# Patient Record
Sex: FEMALE | Race: WHITE | Employment: PART TIME | URBAN - METROPOLITAN AREA
[De-identification: names, ages, dates, MRNs, and addresses within clinical notes are randomized per-mention and may not be internally consistent; named-entity substitution may affect disease eponyms.]

---

## 2017-04-07 ENCOUNTER — TELEPHONE (OUTPATIENT)
Dept: ENDOCRINOLOGY | Age: 27
End: 2017-04-07

## 2019-02-11 ENCOUNTER — OFFICE VISIT (OUTPATIENT)
Dept: FAMILY MEDICINE CLINIC | Age: 29
End: 2019-02-11
Payer: COMMERCIAL

## 2019-02-11 VITALS
BODY MASS INDEX: 35.44 KG/M2 | HEART RATE: 68 BPM | HEIGHT: 64 IN | WEIGHT: 207.6 LBS | DIASTOLIC BLOOD PRESSURE: 72 MMHG | SYSTOLIC BLOOD PRESSURE: 118 MMHG | OXYGEN SATURATION: 99 %

## 2019-02-11 DIAGNOSIS — F31.32 BIPOLAR 1 DISORDER, DEPRESSED, MODERATE (HCC): ICD-10-CM

## 2019-02-11 DIAGNOSIS — E10.65 UNCONTROLLED TYPE 1 DIABETES MELLITUS WITH HYPERGLYCEMIA (HCC): Primary | ICD-10-CM

## 2019-02-11 DIAGNOSIS — E06.3 HASHIMOTO'S DISEASE: ICD-10-CM

## 2019-02-11 PROCEDURE — 2022F DILAT RTA XM EVC RTNOPTHY: CPT | Performed by: FAMILY MEDICINE

## 2019-02-11 PROCEDURE — 99203 OFFICE O/P NEW LOW 30 MIN: CPT | Performed by: FAMILY MEDICINE

## 2019-02-11 PROCEDURE — G8484 FLU IMMUNIZE NO ADMIN: HCPCS | Performed by: FAMILY MEDICINE

## 2019-02-11 PROCEDURE — 4004F PT TOBACCO SCREEN RCVD TLK: CPT | Performed by: FAMILY MEDICINE

## 2019-02-11 PROCEDURE — G8417 CALC BMI ABV UP PARAM F/U: HCPCS | Performed by: FAMILY MEDICINE

## 2019-02-11 PROCEDURE — G8427 DOCREV CUR MEDS BY ELIG CLIN: HCPCS | Performed by: FAMILY MEDICINE

## 2019-02-11 PROCEDURE — 3046F HEMOGLOBIN A1C LEVEL >9.0%: CPT | Performed by: FAMILY MEDICINE

## 2019-02-11 RX ORDER — LAMOTRIGINE 25 MG/1
50 TABLET ORAL NIGHTLY
COMMUNITY
Start: 2018-12-28 | End: 2019-10-01 | Stop reason: DRUGHIGH

## 2019-02-11 RX ORDER — ROSUVASTATIN CALCIUM 20 MG/1
50 TABLET, COATED ORAL NIGHTLY
COMMUNITY
Start: 2018-12-28 | End: 2019-07-22 | Stop reason: SDUPTHER

## 2019-02-11 RX ORDER — LEVOTHYROXINE SODIUM 0.03 MG/1
25 TABLET ORAL 2 TIMES DAILY
COMMUNITY
Start: 2018-12-28 | End: 2019-04-17 | Stop reason: SDUPTHER

## 2019-02-11 ASSESSMENT — ENCOUNTER SYMPTOMS: RESPIRATORY NEGATIVE: 1

## 2019-04-17 ENCOUNTER — OFFICE VISIT (OUTPATIENT)
Dept: FAMILY MEDICINE CLINIC | Age: 29
End: 2019-04-17
Payer: COMMERCIAL

## 2019-04-17 VITALS
OXYGEN SATURATION: 99 % | HEART RATE: 76 BPM | BODY MASS INDEX: 34.97 KG/M2 | SYSTOLIC BLOOD PRESSURE: 116 MMHG | HEIGHT: 64 IN | DIASTOLIC BLOOD PRESSURE: 82 MMHG | WEIGHT: 204.8 LBS

## 2019-04-17 DIAGNOSIS — G43.009 MIGRAINE WITHOUT AURA AND WITHOUT STATUS MIGRAINOSUS, NOT INTRACTABLE: ICD-10-CM

## 2019-04-17 DIAGNOSIS — E10.65 UNCONTROLLED TYPE 1 DIABETES MELLITUS WITH HYPERGLYCEMIA (HCC): ICD-10-CM

## 2019-04-17 DIAGNOSIS — F31.32 BIPOLAR 1 DISORDER, DEPRESSED, MODERATE (HCC): Primary | ICD-10-CM

## 2019-04-17 PROCEDURE — 2022F DILAT RTA XM EVC RTNOPTHY: CPT | Performed by: FAMILY MEDICINE

## 2019-04-17 PROCEDURE — 4004F PT TOBACCO SCREEN RCVD TLK: CPT | Performed by: FAMILY MEDICINE

## 2019-04-17 PROCEDURE — 3046F HEMOGLOBIN A1C LEVEL >9.0%: CPT | Performed by: FAMILY MEDICINE

## 2019-04-17 PROCEDURE — G8427 DOCREV CUR MEDS BY ELIG CLIN: HCPCS | Performed by: FAMILY MEDICINE

## 2019-04-17 PROCEDURE — 99214 OFFICE O/P EST MOD 30 MIN: CPT | Performed by: FAMILY MEDICINE

## 2019-04-17 PROCEDURE — G8417 CALC BMI ABV UP PARAM F/U: HCPCS | Performed by: FAMILY MEDICINE

## 2019-04-17 RX ORDER — LEVOTHYROXINE SODIUM 0.03 MG/1
25 TABLET ORAL DAILY
Qty: 25 TABLET | Refills: 0
Start: 2019-04-17 | End: 2019-06-18

## 2019-04-17 RX ORDER — LAMOTRIGINE 100 MG/1
100 TABLET ORAL DAILY
Qty: 30 TABLET | Refills: 3 | Status: SHIPPED | OUTPATIENT
Start: 2019-04-17 | End: 2019-05-07

## 2019-04-17 NOTE — PROGRESS NOTES
daily  -     levothyroxine (SYNTHROID) 25 MCG tablet;  Take 1 tablet by mouth Daily             Willian Ritchie MD

## 2019-05-03 DIAGNOSIS — E10.65 UNCONTROLLED TYPE 1 DIABETES MELLITUS WITH HYPERGLYCEMIA (HCC): ICD-10-CM

## 2019-05-03 DIAGNOSIS — E06.3 HASHIMOTO'S DISEASE: ICD-10-CM

## 2019-05-03 LAB
A/G RATIO: 1.6 (ref 1.1–2.2)
ALBUMIN SERPL-MCNC: 4.1 G/DL (ref 3.4–5)
ALP BLD-CCNC: 108 U/L (ref 40–129)
ALT SERPL-CCNC: 14 U/L (ref 10–40)
ANION GAP SERPL CALCULATED.3IONS-SCNC: -3 MMOL/L (ref 3–16)
AST SERPL-CCNC: 12 U/L (ref 15–37)
BILIRUB SERPL-MCNC: 0.4 MG/DL (ref 0–1)
BUN BLDV-MCNC: 11 MG/DL (ref 7–20)
CALCIUM SERPL-MCNC: 9.4 MG/DL (ref 8.3–10.6)
CHLORIDE BLD-SCNC: 106 MMOL/L (ref 99–110)
CHOLESTEROL, TOTAL: 126 MG/DL (ref 0–199)
CO2: 28 MMOL/L (ref 21–32)
CREAT SERPL-MCNC: 0.7 MG/DL (ref 0.6–1.1)
CREATININE URINE: 160.9 MG/DL (ref 28–259)
GFR AFRICAN AMERICAN: >60
GFR NON-AFRICAN AMERICAN: >60
GLOBULIN: 2.5 G/DL
GLUCOSE BLD-MCNC: 311 MG/DL (ref 70–99)
HDLC SERPL-MCNC: 40 MG/DL (ref 40–60)
LDL CHOLESTEROL CALCULATED: 60 MG/DL
MICROALBUMIN UR-MCNC: <1.2 MG/DL
MICROALBUMIN/CREAT UR-RTO: NORMAL MG/G (ref 0–30)
POTASSIUM SERPL-SCNC: 4.3 MMOL/L (ref 3.5–5.1)
SODIUM BLD-SCNC: 131 MMOL/L (ref 136–145)
TOTAL PROTEIN: 6.6 G/DL (ref 6.4–8.2)
TRIGL SERPL-MCNC: 128 MG/DL (ref 0–150)
TSH SERPL DL<=0.05 MIU/L-ACNC: 1.68 UIU/ML (ref 0.27–4.2)
VLDLC SERPL CALC-MCNC: 26 MG/DL

## 2019-05-04 LAB
ESTIMATED AVERAGE GLUCOSE: 243.2 MG/DL
HBA1C MFR BLD: 10.1 %

## 2019-05-07 ENCOUNTER — OFFICE VISIT (OUTPATIENT)
Dept: ENDOCRINOLOGY | Age: 29
End: 2019-05-07
Payer: COMMERCIAL

## 2019-05-07 VITALS
WEIGHT: 201.6 LBS | HEART RATE: 70 BPM | DIASTOLIC BLOOD PRESSURE: 74 MMHG | OXYGEN SATURATION: 100 % | SYSTOLIC BLOOD PRESSURE: 104 MMHG | HEIGHT: 64 IN | BODY MASS INDEX: 34.42 KG/M2

## 2019-05-07 DIAGNOSIS — E55.9 VITAMIN D DEFICIENCY: ICD-10-CM

## 2019-05-07 DIAGNOSIS — E66.9 CLASS 1 OBESITY WITH SERIOUS COMORBIDITY AND BODY MASS INDEX (BMI) OF 34.0 TO 34.9 IN ADULT, UNSPECIFIED OBESITY TYPE: ICD-10-CM

## 2019-05-07 DIAGNOSIS — E06.3 HASHIMOTO'S THYROIDITIS: ICD-10-CM

## 2019-05-07 DIAGNOSIS — E04.9 GOITER: ICD-10-CM

## 2019-05-07 PROCEDURE — G8417 CALC BMI ABV UP PARAM F/U: HCPCS | Performed by: INTERNAL MEDICINE

## 2019-05-07 PROCEDURE — 2022F DILAT RTA XM EVC RTNOPTHY: CPT | Performed by: INTERNAL MEDICINE

## 2019-05-07 PROCEDURE — G8427 DOCREV CUR MEDS BY ELIG CLIN: HCPCS | Performed by: INTERNAL MEDICINE

## 2019-05-07 PROCEDURE — 99204 OFFICE O/P NEW MOD 45 MIN: CPT | Performed by: INTERNAL MEDICINE

## 2019-05-07 PROCEDURE — 4004F PT TOBACCO SCREEN RCVD TLK: CPT | Performed by: INTERNAL MEDICINE

## 2019-05-07 PROCEDURE — 3046F HEMOGLOBIN A1C LEVEL >9.0%: CPT | Performed by: INTERNAL MEDICINE

## 2019-05-07 NOTE — PROGRESS NOTES
Andre Sanchez is a 34 y.o. female who presents for Type 1 diabetes mellitus. Current symptoms/problems include hyperglycemia and are worsening. 1.  Type 1 diabetes, uncontrolled, with neuropathy (HCC) [E10.40, E10.65]    Diagnosed with Type 1 diabetes mellitus in 2010.     Comorbid conditions: Neuropathy    Current diabetic medications include: Novolog, Tresiba    Intolerance to diabetes medications: No     Weight trend: stable  Prior visit with dietician: yes  Current diet: on average, 1 meal per day and other snacks  Current exercise: occasional, active     Current monitoring regimen: home blood tests - 5 times daily  Has brought blood glucose log/meter:  Yes  Home blood sugar records: fasting range: 170-200 and postprandial range: 170-300   Any episodes of hypoglycemia? Yes  Hypoglycemia frequency and time(s):  2 a week  Does patient have Glucagon emergency kit? No  Does patient have rapid acting carbohydrate? Yes  Does patient wear a medic alert bracelet or necklace? No    2. Hashimoto's thyroiditis  Has fatigue. Dx in 2016.    3. Vitamin D deficiency  Has fatigue. 4. Class 1 obesity with serious comorbidity and body mass index (BMI) of 34.0 to 34.9 in adult, unspecified obesity type  Eats healthier. Occasional exercise. 5. Goiter  No difficulty swallowing, hoarseness. .  No family history of thyroid cancer. No radiation to her neck.     Past Medical History:   Diagnosis Date    Bipolar 1 disorder ([address]     Diabetes mellitus ([address]     Diabetes mellitus type I ([address]     Diabetic keto-acidosis ([address] 2014    admitted at [address] disease       Patient Active Problem List   Diagnosis    Type 1 diabetes mellitus, uncontrolled ([address]    Vitamin D deficiency    Hashimoto's thyroiditis    Bipolar 1 disorder, depressed, moderate (HCC)    Type 1 diabetes, uncontrolled, with neuropathy ([address]    Class 1 obesity with body mass index (BMI) of 34.0 to 34.9 in adult    Goiter     Past Surgical History:   Procedure Laterality Date    LAPAROSCOPY  05/01/2016    WISDOM TOOTH EXTRACTION Bilateral 2007     Social History     Socioeconomic History    Marital status: Single     Spouse name: Not on file    Number of children: Not on file    Years of education: Not on file    Highest education level: Not on file   Occupational History    Not on file   Social Needs    Financial resource strain: Not on file    Food insecurity:     Worry: Not on file     Inability: Not on file    Transportation needs:     Medical: Not on file     Non-medical: Not on file   Tobacco Use    Smoking status: Current Some Day Smoker     Years: 4.00    Smokeless tobacco: Never Used    Tobacco comment: social smoker, once a month   Substance and Sexual Activity    Alcohol use: Yes     Comment: once a month    Drug use: Yes     Types: Marijuana    Sexual activity: Yes   Lifestyle    Physical activity:     Days per week: Not on file     Minutes per session: Not on file    Stress: Not on file   Relationships    Social connections:     Talks on phone: Not on file     Gets together: Not on file     Attends Worship service: Not on file     Active member of club or organization: Not on file     Attends meetings of clubs or organizations: Not on file     Relationship status: Not on file    Intimate partner violence:     Fear of current or ex partner: Not on file     Emotionally abused: Not on file     Physically abused: Not on file     Forced sexual activity: Not on file   Other Topics Concern    Not on file   Social History Narrative    Not on file     Family History   Problem Relation Age of Onset    Diabetes type 2  Mother     Bipolar Disorder Mother     Hypertension Mother     Alcohol Abuse Father     Depression Father     Asthma Sister     Depression Brother     High Cholesterol Brother      Current Outpatient Medications   Medication Sig Dispense Refill    insulin aspart Case Alfonso FLEXPEN) 100 UNIT/ML injection pen INJECT UP 40 UNITS 3 TIMES A Day plus correction 40 pen 2    Insulin Degludec (TRESIBA FLEXTOUCH) 200 UNIT/ML SOPN Inject 80 Units into the skin nightly 6 pen 2    levothyroxine (SYNTHROID) 25 MCG tablet Take 1 tablet by mouth Daily 25 tablet 0    lamoTRIgine (LAMICTAL) 25 MG tablet Take 50 mg by mouth nightly      rosuvastatin (CRESTOR) 20 MG tablet Take 50 mg by mouth nightly      Blood Glucose Monitoring Suppl (FREESTYLE LITE) VINCENT 1 Device by Does not apply route 5 times daily 1 Device 1    glucose blood VI test strips (FREESTYLE LITE) strip 5 times daily. 450 strip 3    FREESTYLE LANCETS MISC 1 each by Does not apply route 5 times daily 450 each 3    Insulin Pen Needle 32G X 4 MM MISC 1 each by Does not apply route 5 times daily 450 each 3     No current facility-administered medications for this visit.       No Known Allergies  Family Status   Relation Name Status    Mother  Alive    Father  Alive    Sister  Alive    Brother  Alive       Lab Review:    Lab Results   Component Value Date    WBC 5.7 06/04/2013    HGB 12.2 06/04/2013    HCT 36.9 06/04/2013    MCV 86.5 06/04/2013    PLT COM 06/04/2013     Lab Results   Component Value Date     05/03/2019    K 4.3 05/03/2019     05/03/2019    CO2 28 05/03/2019    BUN 11 05/03/2019    CREATININE 0.7 05/03/2019    GLUCOSE 311 05/03/2019    CALCIUM 9.4 05/03/2019    PROT 6.6 05/03/2019    LABALBU 4.1 05/03/2019    BILITOT 0.4 05/03/2019    ALKPHOS 108 05/03/2019    AST 12 05/03/2019    ALT 14 05/03/2019    LABGLOM >60 05/03/2019    GFRAA >60 05/03/2019    GFRAA 177 06/04/2013    AGRATIO 1.6 05/03/2019    GLOB 2.5 05/03/2019     Lab Results   Component Value Date    TSH 1.68 05/03/2019    FT3 2.5 08/18/2016     Lab Results   Component Value Date    LABA1C 10.1 05/03/2019     Lab Results   Component Value Date    .2 05/03/2019     Lab Results   Component Value Date    CHOL 126 05/03/2019     Lab Results infections, no frequent illnesses  Endocrine: no temperature intolerance    /74 (Site: Left Upper Arm, Position: Sitting, Cuff Size: Large Adult)   Pulse 70   Ht 5' 4\" (1.626 m)   Wt 201 lb 9.6 oz (91.4 kg)   SpO2 100%   BMI 34.60 kg/m²    Wt Readings from Last 3 Encounters:   05/07/19 201 lb 9.6 oz (91.4 kg)   04/17/19 204 lb 12.8 oz (92.9 kg)   02/11/19 207 lb 9.6 oz (94.2 kg)     Body mass index is 34.6 kg/m².       OBJECTIVE:  Constitutional: no acute distress, well appearing and well nourished  Psychiatric: oriented to person, place and time, judgement and insight and normal, recent and remote memory and intact and mood and affect are normal  Skin: skin and subcutaneous tissue is normal without mass, normal turgor  Head and Face: examination of head and face revealed no abnormalities  Eyes: no lid or conjunctival swelling, erythema or discharge, pupils are normal, equal, round, reactive to light  Ears/Nose: external inspection of ears and nose revealed no abnormalities, hearing is grossly normal  Oropharynx/Mouth/Face: lips, tongue and gums are normal with no lesions, the voice quality was normal  Neck: neck is supple and symmetric, with midline trachea and no masses, thyroid is enlarged  Lymphatics: normal cervical lymph nodes, normal supraclavicular nodes  Pulmonary: no increased work of breathing or signs of respiratory distress, lungs are clear to auscultation  Cardiovascular: normal heart rate and rhythm, normal S1 and S2, no murmurs and pedal pulses and 2+ bilaterally, No edema  Abdomen: abdomen is soft, non-tender with no masses  Musculoskeletal: normal gait and station and exam of the digits and nails are normal  Neurological: normal coordination and normal general cortical function    Visual inspection:  Deformity/amputation: absent  Skin lesions/pre-ulcerative calluses: absent  Edema: right- negative, left- negative    Sensory exam:  Monofilament sensation: normal  (minimum of 5 random plantar locations tested, avoiding callused areas - > 1 area with absence of sensation is + for neuropathy)    Plus at least one of the following:  Pulses: normal  Proprioception: Intact  Vibration (128 Hz): Impaired    ASSESSMENT/PLAN:  1. Type 1 diabetes, uncontrolled, with neuropathy (HCC)  Consider adding metformin for insulin resistance. - insulin aspart (NOVOLOG FLEXPEN) 100 UNIT/ML injection pen; INJECT UP 40 UNITS 3 TIMES A Day plus correction  Dispense: 40 pen; Refill: 2  - Insulin Degludec (TRESIBA FLEXTOUCH) 200 UNIT/ML SOPN; Inject 80 Units into the skin nightly  Dispense: 6 pen; Refill: 2  - Comprehensive Metabolic Panel; Future    2. Hashimoto's thyroiditis  Thyroid sonogram  TPOab, Tg ab.    3. Vitamin D deficiency  Not on supplement. Obtain 25OH vitamin D    4. Class 1 obesity with serious comorbidity and body mass index (BMI) of 34.0 to 34.9 in adult, unspecified obesity type  Diet, exercise. 5. Goiter  Thyroid sono    Reviewed and/or ordered clinical lab results Yes  Reviewed and/or ordered radiology tests Yes  Reviewed and/or ordered other diagnostic tests No  Discussed test results with performing physician No  Independently reviewed image, tracing, or specimen No  Made a decision to obtain old records No  Reviewed and summarized old records Yes  Hemoglobin A1c 10.1  LDL 60  TSH 1.68  Obtained history from other than patient No    Reji Jewell was counseled regarding symptoms of diabetes diagnosis, course and complications of disease if inadequately treated, side effects of medications, diagnosis, treatment options, and prognosis, risks, benefits, complications, and alternatives of treatment, labs, imaging and other studies and treatment targets and goals. She understands instructions and counseling. Return 2 weeks with Maranda 40 min for pump discussion, 4-6 weeks with me for diabetes, for 40 min.

## 2019-06-10 ENCOUNTER — OFFICE VISIT (OUTPATIENT)
Dept: ENDOCRINOLOGY | Age: 29
End: 2019-06-10
Payer: COMMERCIAL

## 2019-06-10 VITALS
HEART RATE: 65 BPM | BODY MASS INDEX: 33.77 KG/M2 | HEIGHT: 64 IN | OXYGEN SATURATION: 98 % | SYSTOLIC BLOOD PRESSURE: 99 MMHG | WEIGHT: 197.8 LBS | DIASTOLIC BLOOD PRESSURE: 67 MMHG

## 2019-06-10 DIAGNOSIS — E10.65 UNCONTROLLED TYPE 1 DIABETES MELLITUS WITH HYPERGLYCEMIA (HCC): ICD-10-CM

## 2019-06-10 PROCEDURE — 99213 OFFICE O/P EST LOW 20 MIN: CPT | Performed by: NURSE PRACTITIONER

## 2019-06-10 PROCEDURE — 4004F PT TOBACCO SCREEN RCVD TLK: CPT | Performed by: NURSE PRACTITIONER

## 2019-06-10 PROCEDURE — 95250 CONT GLUC MNTR PHYS/QHP EQP: CPT | Performed by: NURSE PRACTITIONER

## 2019-06-10 PROCEDURE — G8427 DOCREV CUR MEDS BY ELIG CLIN: HCPCS | Performed by: NURSE PRACTITIONER

## 2019-06-10 PROCEDURE — G8417 CALC BMI ABV UP PARAM F/U: HCPCS | Performed by: NURSE PRACTITIONER

## 2019-06-10 PROCEDURE — 2022F DILAT RTA XM EVC RTNOPTHY: CPT | Performed by: NURSE PRACTITIONER

## 2019-06-10 PROCEDURE — 3046F HEMOGLOBIN A1C LEVEL >9.0%: CPT | Performed by: NURSE PRACTITIONER

## 2019-06-10 ASSESSMENT — ENCOUNTER SYMPTOMS
CONSTIPATION: 0
SHORTNESS OF BREATH: 0
EYE PAIN: 0
COLOR CHANGE: 0
DIARRHEA: 0
NAUSEA: 0

## 2019-06-10 NOTE — PROGRESS NOTES
Endocrinology  [facility], [address] [place] [address]  Phone: [phone]   FAX: [phone]    Keith Del Rio is a 34 y.o. female who is a new patient presenting for ongoing  management of Diabetes Mellitus Type 1.     Diabetes:  Last A1C:   Lab Results   Component Value Date    LABA1C 10.1 2019    LABA1C 11.0 2016    LABA1C 10.2 (H) 2013     Last BP Readings:  BP Readings from Last 3 Encounters:   06/10/19 99/67   19 104/74   19 116/82     Last LDL:   Lab Results   Component Value Date    LDLCALC 60 2019     Aspirin Use: no    Tobacco History:    Smoking status: Current Some Day Smoker     Years: 4.00    Smokeless tobacco: Never Used    Tobacco comment: social smoker, once a month    Alcohol use: Yes     Comment: once a month    Drug use: Yes     Types: Marijuana    Physical activity:                        Very physically active work 2 hours/day     Days per week: Not on file     Diabetes type: Type 1  Diagnosed at age : 21  Previous endocrinologist/s: Odalys Grey, currently Dr Isael Cardoza MD  Previous oral treatments: none  Previous injectable treatments:   Tresiba 80 units QHS --> 100%   Novalog AC TID with SSI --> skips when skipping meals, may eat 1-2 meals a day    Insulin injection sites: Abdomen, 4 times a day, may sometimes forget, taking > 80%  Blood sugar monitorin times a day  Hypoglycemia: Occasional - can occur at various times of day , has gone as low as 40s, lowest ever 23 ( blacked  Out)  Has glucagon at home - family knows how to use it  Other symptoms: none  Comorbid Conditons:   Neuropathy - recently mild neuropathy  Retinopathy - no diabetic eye disease, acuity fluctuates with high BG  Nephropathy,   Component    Latest Ref Rng & Units 5/3/2019   Microalbumin, Random Urine    <2.0 mg/dL <1.20   Creatinine, Ur    28.0 - 259.0 mg/dL 160.9   Microalbumin Creatinine Ratio    0.0 - 30.0 mg/g see below     Past Medical History:   Diagnosis Date    [facility]  Diabetes mellitus [facility]     Diabetes mellitus type I ([facility]     Diabetic keto-acidosis ([facility] 2014    admitted at [address] disease      Schedule: 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 Soup ( chicken noodle )  Dinner: soup,   Snacks: crackers, pb sw,  Beverages: water, soda, sometimes coffee ( [employer] k cups)  Alcohol: infrequent     Exercise: no structured exercise  Checks ketones prior to exercise no     Diabetic Health Maintenance  Is patient on ACE or ARB: none  Stain: crestor 20 mg  Last Eye: in 4/2019  Last Foot: at last visit  Diabetes education: yes   There have been no recent hospital or ED admissions for hypoglycemia, hyperglycemia or DKA  Most recent DKA: 2 years ago    Hyperlipidemia: Currently is on Crestor 20 mg. Current complaints include occasional myalgias but otherwise tolerates well. Lab Results   Component Value Date    CHOL 126 05/03/2019    CHOL 222 08/18/2016     Lab Results   Component Value Date    TRIG 128 05/03/2019    TRIG 184 08/18/2016     Lab Results   Component Value Date    HDL 40 05/03/2019    HDL 57 08/18/2016     Lab Results   Component Value Date    LDLCALC 60 05/03/2019    [address] 128 08/18/2016     No results found for: LDLDIRECT  No results found for: CHOLHDLRATIO    Vitamin D deficiency: Currently is on no supplementation. Current complaints includefatigue on daily basis. Last vitamin D level is:  Lab Results   Component Value Date    VITD25 23.6 08/18/2016    VITD25 20.2 02/01/2016       Hypertension  Currently on no antihypertensive. Controlled , denies symptoms of dizziness,light headedness. Occasional dependent edema. Tries to Dose not follow a salt restricted diet.    Lab Results   Component Value Date     (L) 05/03/2019    K 4.3 05/03/2019     05/03/2019 CO2 28 05/03/2019    BUN 11 05/03/2019    CREATININE 0.7 05/03/2019    GLUCOSE 311 (H) 05/03/2019    CALCIUM 9.4 05/03/2019    PROT 6.6 05/03/2019    LABALBU 4.1 05/03/2019    BILITOT 0.4 05/03/2019    ALKPHOS 108 05/03/2019    AST 12 (L) 05/03/2019    ALT 14 05/03/2019    LABGLOM >60 05/03/2019    GFRAA >60 05/03/2019    AGRATIO 1.6 05/03/2019    GLOB 2.5 05/03/2019       The ASCVD Risk score (Saad Good et al., 2013) failed to calculate for the following reasons: The 2013 ASCVD risk score is only valid for ages 36 to 78    Family History   Problem Relation Age of Onset    Diabetes type 2  Mother     Bipolar Disorder Mother     Hypertension Mother     Alcohol Abuse Father     Depression Father     Asthma Sister     Depression Brother     High Cholesterol Brother      Review of Systems   Constitutional: Negative for activity change, appetite change, diaphoresis, fever and unexpected weight change. HENT: Negative for dental problem. Eyes: Negative for pain and visual disturbance. Respiratory: Negative for shortness of breath. Cardiovascular: Negative for chest pain, palpitations and leg swelling. Gastrointestinal: Negative for constipation, diarrhea and nausea. Endocrine: Negative for cold intolerance, heat intolerance, polydipsia, polyphagia and polyuria. Genitourinary: Negative for frequency and urgency. Musculoskeletal: Negative for arthralgias, joint swelling and myalgias. Skin: Negative for color change and pallor. Neurological: Negative for weakness, numbness and headaches. Psychiatric/Behavioral: Negative for dysphoric mood and sleep disturbance. The patient is not nervous/anxious. Vitals:    06/10/19 0848   BP: 99/67   Site: Left Upper Arm   Position: Sitting   Cuff Size: Large Adult   Pulse: 65   SpO2: 98%   Weight: 197 lb 12.8 oz (89.7 kg)   Height: 5' 4\" (1.626 m)     Physical Exam   Constitutional: She is oriented to person, place, and time.  She appears well-developed and well-nourished. Eyes: Pupils are equal, round, and reactive to light. Neck: Normal range of motion. No thyromegaly present. Cardiovascular: Normal rate, regular rhythm and normal heart sounds. Pulmonary/Chest: Effort normal and breath sounds normal.   Abdominal: She exhibits no distension. Musculoskeletal: Normal range of motion. She exhibits no edema. Neurological: She is alert and oriented to person, place, and time. No sensory deficit. Skin: Skin is warm and dry. No skin changes or evidence of trauma. Psychiatric: She has a normal mood and affect. Her behavior is normal. Thought content normal.   Vitals reviewed. Skeletal foot exam: deferred. Miesha Leija is a 34 y.o. female has uncontrolled Diabetes Mellitus Type 1 complicated by  Peripheral neuropathy and associated with Hashimoto's thyroiditis, goiter, vitamin D deficiency and class 1 obesity. She is interested in starting on an insulin for  Improved glycemic control. Plan  Problem List Items Addressed This Visit     Type 1 diabetes mellitus, uncontrolled ([facility]     Patient BG are still high  Diet is very high in processed carbs: beverages- regular soda   Discussed modifications to diet  Insulin dose quite high for type 1    Insulin   Toujeo : 60 units  Starting dose :  60 unit  Increase by 1 unit for every three days that fastingblood sugars are > 150  Decrease by 1 unit for any given day that fasting blood sugars are < 90  Enter in log book as discussed an bring it to the next visit    Meal time instructions:   Calculate the dose of novalog : CR and Insulin SS  novalog  I :  Sliding scale for before meal insulin  <150 : do not need to cover for blood glucose  151- 200 Add  2 units  201- 250 Add 4 units  251- 300 Add 6 units   301- 350 Add 8 units  351- 400 Add 10 units    II: carb ratio  1: 15 grams of carbs      Approach for  your diet  1. Use Calorieking. com to look up carb counts as discussed at visit  2. Target for 30 grams of carbohydrates or less per meal, and plan for three meals a day  3. Snacks if consumed should be low in carb, and avoid all processed snacks as far as possible  4. Ensure 20 grams of protein and moderate good fats: olive oil, coconut oil, butter, nuts  5. Decrease carb consumption in beverage form: regular soda, fruit juices  6. Moderate protein and good fats are ok. 7. Increase fiber via vegetables. Limit fruit intake. 8. Eliminate use of sugar as far as possible, minimize  all artificial sweeteners. 9. Ensure good hydration: 80 -100 oz minimum  10. Ensure electrolyte replacement: powerade or gatorade ZERO or pedialyte etc.     Log book provided at visit: please enter carb /macro nutrients /blood sugars/ insulin dose and bring your glucometer and book at next visit    Physical Activity  Recommended exercise is 5-7 days a week for 30-60 mins at least, per day OR a total of 2.5 hours per week , which ever is more feasible for you    Diabetic Health Maintenance  Follow up with annual eye exams  Check feet daily and make sure injuries do not go unnoticed. Other areas of Diabetic Education reviewed:   Carbs: good carbs and bad carbs, importance of carb counting, incorporation of protein with each meal to reduce Glycemic index, importance of portions, Carb/insulin ratio   Fats: Good fats and bad fats, meal planning and supplements.  Discussed how food affects blood sugar readings.  Insulin pumps, how they work and how they affect blood sugar levels   Steroids and effects on blood sugars   Different diabetic medications   Managing high and low sugar readings   Effects of smoking and diabetes    Rotation of sites for subcutaneous medication injection   Provided with information about various pumps and selection criteria.          Relevant Medications    glucagon 1 MG injection    Type 1 diabetes, uncontrolled, with neuropathy ([address] - Primary    Relevant Medications

## 2019-06-10 NOTE — PATIENT INSTRUCTIONS
Lab Results   Component Value Date    LABA1C 10.1 05/03/2019     Lab Results   Component Value Date    .2 05/03/2019     Toujeo : 60 units  Starting dose :  60 unit  Increase by 1 unit for every three days that fastingblood sugars are > 150  Decrease by 1 unit for any given day that fasting blood sugars are < 90  Enter in log book as discussed an bring it to the next visit    Meal time instructions: novalog  I :  Sliding scale for before meal insulin  <150 : do not need to cover for blood glucose  151- 200 Add  2 units  201- 250 Add 4 units  251- 300 Add 6 units   301- 350 Add 8 units  351- 400 Add 10 units    II: carb ratio  1: 15 grams of carbs      Approach for  your diet  1. Use Calorieking. com to look up carb counts as discussed at visit  2. Target for 30 grams of carbohydrates or less per meal, and plan for three meals a day  3. Snacks if consumed should be low in carb, and avoid all processed snacks as far as possible  4. Ensure 20 grams of protein and moderate good fats: olive oil, coconut oil, butter, nuts  5. Decrease carb consumption in beverage form: regular soda, fruit juices  6. Moderate protein and good fats are ok. 7. Increase fiber via vegetables. Limit fruit intake. 8. Eliminate use of sugar as far as possible, minimize  all artificial sweeteners. 9. Ensure good hydration: 80 -100 oz minimum  10. Ensure electrolyte replacement: powerade or gatorade ZERO or pedialyte etc.     Log book provided at visit: please enter carb /macro nutrients /blood sugars/ insulin dose and bring your glucometer and book at next visit    Physical Activity  Recommended exercise is 5-7 days a week for 30-60 mins at least, per day OR a total of 2.5 hours per week , which ever is more feasible for you    Diabetic Health Maintenance  Follow up with annual eye exams  Check feet daily and make sure injuries do not go unnoticed.     Other areas of Diabetic Education reviewed:   Carbs: good carbs and bad carbs, importance of carb counting, incorporation of protein with each meal to reduce Glycemic index, importance of portions, Carb/insulin ratio   Fats: Good fats and bad fats, meal planning and supplements.  Discussed how food affects blood sugar readings.     Insulin pumps, how they work and how they affect blood sugar levels   Steroids and effects on blood sugars   Different diabetic medications   Managing high and low sugar readings   Effects of smoking and diabetes    Rotation of sites for subcutaneous medication injection

## 2019-06-11 NOTE — ASSESSMENT & PLAN NOTE
Patient BG are still high  Diet is very high in processed carbs: beverages- regular soda   Discussed modifications to diet  Insulin dose quite high for type 1    Insulin   Toujeo : 60 units  Starting dose :  60 unit  Increase by 1 unit for every three days that fastingblood sugars are > 150  Decrease by 1 unit for any given day that fasting blood sugars are < 90  Enter in log book as discussed an bring it to the next visit    Meal time instructions:   Calculate the dose of novalog : CR and Insulin SS  novalog  I :  Sliding scale for before meal insulin  <150 : do not need to cover for blood glucose  151- 200 Add  2 units  201- 250 Add 4 units  251- 300 Add 6 units   301- 350 Add 8 units  351- 400 Add 10 units    II: carb ratio  1: 15 grams of carbs      Approach for  your diet  1. Use Calorieking. com to look up carb counts as discussed at visit  2. Target for 30 grams of carbohydrates or less per meal, and plan for three meals a day  3. Snacks if consumed should be low in carb, and avoid all processed snacks as far as possible  4. Ensure 20 grams of protein and moderate good fats: olive oil, coconut oil, butter, nuts  5. Decrease carb consumption in beverage form: regular soda, fruit juices  6. Moderate protein and good fats are ok. 7. Increase fiber via vegetables. Limit fruit intake. 8. Eliminate use of sugar as far as possible, minimize  all artificial sweeteners. 9. Ensure good hydration: 80 -100 oz minimum  10.  Ensure electrolyte replacement: powerade or gatorade ZERO or pedialyte etc.     Log book provided at visit: please enter carb /macro nutrients /blood sugars/ insulin dose and bring your glucometer and book at next visit    Physical Activity  Recommended exercise is 5-7 days a week for 30-60 mins at least, per day OR a total of 2.5 hours per week , which ever is more feasible for you    Diabetic Health Maintenance  Follow up with annual eye exams  Check feet daily and make sure injuries do not go

## 2019-06-14 DIAGNOSIS — E06.3 HASHIMOTO'S THYROIDITIS: ICD-10-CM

## 2019-06-15 LAB
A/G RATIO: 2.1 (ref 1.1–2.2)
ALBUMIN SERPL-MCNC: 4.4 G/DL (ref 3.4–5)
ALP BLD-CCNC: 81 U/L (ref 40–129)
ALT SERPL-CCNC: 21 U/L (ref 10–40)
ANION GAP SERPL CALCULATED.3IONS-SCNC: 11 MMOL/L (ref 3–16)
ANTI-THYROGLOB ABS: <10 IU/ML
AST SERPL-CCNC: 20 U/L (ref 15–37)
BILIRUB SERPL-MCNC: 0.5 MG/DL (ref 0–1)
BUN BLDV-MCNC: 7 MG/DL (ref 7–20)
CALCIUM SERPL-MCNC: 8.9 MG/DL (ref 8.3–10.6)
CHLORIDE BLD-SCNC: 105 MMOL/L (ref 99–110)
CO2: 25 MMOL/L (ref 21–32)
CREAT SERPL-MCNC: 0.6 MG/DL (ref 0.6–1.1)
GFR AFRICAN AMERICAN: >60
GFR NON-AFRICAN AMERICAN: >60
GLOBULIN: 2.1 G/DL
GLUCOSE BLD-MCNC: 224 MG/DL (ref 70–99)
POTASSIUM SERPL-SCNC: 4.6 MMOL/L (ref 3.5–5.1)
SODIUM BLD-SCNC: 141 MMOL/L (ref 136–145)
T3 FREE: 3.1 PG/ML (ref 2.3–4.2)
T4 FREE: 0.8 NG/DL (ref 0.9–1.8)
THYROID PEROXIDASE (TPO) ABS: 146 IU/ML
TOTAL PROTEIN: 6.5 G/DL (ref 6.4–8.2)
TSH SERPL DL<=0.05 MIU/L-ACNC: 2.39 UIU/ML (ref 0.27–4.2)

## 2019-06-18 ENCOUNTER — OFFICE VISIT (OUTPATIENT)
Dept: ENDOCRINOLOGY | Age: 29
End: 2019-06-18
Payer: COMMERCIAL

## 2019-06-18 VITALS
BODY MASS INDEX: 33.84 KG/M2 | OXYGEN SATURATION: 99 % | HEART RATE: 73 BPM | SYSTOLIC BLOOD PRESSURE: 109 MMHG | HEIGHT: 64 IN | WEIGHT: 198.2 LBS | DIASTOLIC BLOOD PRESSURE: 61 MMHG

## 2019-06-18 DIAGNOSIS — E06.3 HASHIMOTO'S THYROIDITIS: ICD-10-CM

## 2019-06-18 DIAGNOSIS — E03.9 ACQUIRED HYPOTHYROIDISM: ICD-10-CM

## 2019-06-18 DIAGNOSIS — E04.9 GOITER: ICD-10-CM

## 2019-06-18 DIAGNOSIS — E66.9 CLASS 1 OBESITY WITH SERIOUS COMORBIDITY AND BODY MASS INDEX (BMI) OF 34.0 TO 34.9 IN ADULT, UNSPECIFIED OBESITY TYPE: ICD-10-CM

## 2019-06-18 DIAGNOSIS — E78.00 PURE HYPERCHOLESTEROLEMIA: ICD-10-CM

## 2019-06-18 DIAGNOSIS — E55.9 VITAMIN D DEFICIENCY: ICD-10-CM

## 2019-06-18 PROCEDURE — 4004F PT TOBACCO SCREEN RCVD TLK: CPT | Performed by: INTERNAL MEDICINE

## 2019-06-18 PROCEDURE — G8417 CALC BMI ABV UP PARAM F/U: HCPCS | Performed by: INTERNAL MEDICINE

## 2019-06-18 PROCEDURE — 2022F DILAT RTA XM EVC RTNOPTHY: CPT | Performed by: INTERNAL MEDICINE

## 2019-06-18 PROCEDURE — G8427 DOCREV CUR MEDS BY ELIG CLIN: HCPCS | Performed by: INTERNAL MEDICINE

## 2019-06-18 PROCEDURE — 3046F HEMOGLOBIN A1C LEVEL >9.0%: CPT | Performed by: INTERNAL MEDICINE

## 2019-06-18 PROCEDURE — 99215 OFFICE O/P EST HI 40 MIN: CPT | Performed by: INTERNAL MEDICINE

## 2019-06-18 RX ORDER — LEVOTHYROXINE SODIUM 0.05 MG/1
50 TABLET ORAL DAILY
Qty: 30 TABLET | Refills: 3 | Status: SHIPPED | OUTPATIENT
Start: 2019-06-18 | End: 2019-08-22

## 2019-06-18 NOTE — PROGRESS NOTES
Xiao Masterson is a 34 y.o. female who presents for Type 1 diabetes mellitus. Current symptoms/problems include hyperglycemia and are worsening. 1.  Type 1 diabetes, uncontrolled, with neuropathy (HCC) [E10.40, E10.65]    Diagnosed with Type 1 diabetes mellitus in 2010.     Comorbid conditions: Neuropathy    Current diabetic medications include: Novolog, Tresiba    Intolerance to diabetes medications: No     Weight trend: stable  Prior visit with dietician: yes  Current diet: on average, 1 meal per day and other snacks  Current exercise: occasional, active     Current monitoring regimen: home blood tests - 5 times daily  Has brought blood glucose log/meter:  Yes  Home blood sugar records: fasting range: 170-200 and postprandial range: 170-300   Any episodes of hypoglycemia? Yes  Hypoglycemia frequency and time(s):  2 a week  Does patient have Glucagon emergency kit? No  Does patient have rapid acting carbohydrate? Yes  Does patient wear a medic alert bracelet or necklace? No    2. Hashimoto's thyroiditis  Has fatigue. Dx in 2016.    3. Vitamin D deficiency  Has fatigue. 4. Class 1 obesity with serious comorbidity and body mass index (BMI) of 34.0 to 34.9 in adult, unspecified obesity type  Eats healthier. Occasional exercise. 5. Goiter  No difficulty swallowing, hoarseness. .  No family history of thyroid cancer. No radiation to her neck. 6. Hyperlipidemia  No muscle pain    7.  Hypothyroidism  Has fatigue    Past Medical History:   Diagnosis Date    Bipolar 1 disorder ([address]     Diabetes mellitus ([address]     Diabetes mellitus type I ([address]     Diabetic keto-acidosis ([address] 2014    admitted at [address] disease       Patient Active Problem List   Diagnosis    Vitamin D deficiency    Hashimoto's thyroiditis    Bipolar 1 disorder, depressed, moderate (HCC)    Type 1 diabetes, uncontrolled, with neuropathy ([address]    Class 1 obesity with body mass index (BMI) of 34.0 Dispense Refill    insulin aspart (NOVOLOG FLEXPEN) 100 UNIT/ML injection pen INJECT UP 40 UNITS 3 TIMES A Day plus correction 40 pen 2    Insulin Degludec (TRESIBA FLEXTOUCH) 200 UNIT/ML SOPN Inject 80 Units into the skin nightly 6 pen 2    levothyroxine (SYNTHROID) 50 MCG tablet Take 1 tablet by mouth Daily 30 tablet 3    glucagon 1 MG injection Inject 1 mg into the muscle See Admin Instructions Follow package directions for low blood sugar. 1 kit 3    acetone, urine, test (KETOSTIX) strip Check urine for ketones if blood glucose is > 250 30 strip 3    lamoTRIgine (LAMICTAL) 25 MG tablet Take 50 mg by mouth nightly      rosuvastatin (CRESTOR) 20 MG tablet Take 50 mg by mouth nightly      Blood Glucose Monitoring Suppl (FREESTYLE LITE) VINCENT 1 Device by Does not apply route 5 times daily 1 Device 1    glucose blood VI test strips (FREESTYLE LITE) strip 5 times daily. 450 strip 3    FREESTYLE LANCETS MISC 1 each by Does not apply route 5 times daily 450 each 3    Insulin Pen Needle 32G X 4 MM MISC 1 each by Does not apply route 5 times daily 450 each 3     No current facility-administered medications for this visit.       No Known Allergies  Family Status   Relation Name Status    Mother  Alive    Father  Alive    Sister  Alive    Brother  Alive       Lab Review:    Lab Results   Component Value Date    WBC 5.7 06/04/2013    HGB 12.2 06/04/2013    HCT 36.9 06/04/2013    MCV 86.5 06/04/2013    PLT COM 06/04/2013     Lab Results   Component Value Date     06/14/2019    K 4.6 06/14/2019     06/14/2019    CO2 25 06/14/2019    BUN 7 06/14/2019    CREATININE 0.6 06/14/2019    GLUCOSE 224 06/14/2019    CALCIUM 8.9 06/14/2019    PROT 6.5 06/14/2019    LABALBU 4.4 06/14/2019    BILITOT 0.5 06/14/2019    ALKPHOS 81 06/14/2019    AST 20 06/14/2019    ALT 21 06/14/2019    LABGLOM >60 06/14/2019    GFRAA >60 06/14/2019    GFRAA 177 06/04/2013    AGRATIO 2.1 06/14/2019    GLOB 2.1 06/14/2019     Lab Results   Component Value Date    TSH 2.39 06/14/2019    FT3 3.1 06/14/2019     Lab Results   Component Value Date    LABA1C 10.1 05/03/2019     Lab Results   Component Value Date    .2 05/03/2019     Lab Results   Component Value Date    CHOL 126 05/03/2019     Lab Results   Component Value Date    TRIG 128 05/03/2019     Lab Results   Component Value Date    HDL 40 05/03/2019     Lab Results   Component Value Date    LDLCALC 60 05/03/2019     Lab Results   Component Value Date    LABVLDL 26 05/03/2019     No results found for: [facility]  Lab Results   Component Value Date    LABMICR <1.20 05/03/2019     Lab Results   Component Value Date    VITD25 23.6 08/18/2016        Review of Systems:  Constitutional: has fatigue, no fever, no recent weight gain, no recent weight loss, no changes in appetite  Eyes: no eye pain, no change in vision, no eye redness, no eye irritation, no double vision  Ears, nose, throat: has nasal congestion, no sore throat, no earache, no decrease in hearing, no hoarseness, no dry mouth, has sinus problems, no difficulty swallowing, no neck lumps, no dental problems, no mouth sores, no ringing in ears  Pulmonary: no shortness of breath, no wheezing, no dyspnea on exertion, no cough  Cardiovascular: no chest pain, has lower extremity edema, no orthopnea, no intermittent leg claudication, has palpitations  Gastrointestinal: no abdominal pain, no nausea, no vomiting, has diarrhea, has constipation, no dysphagia, no heartburn, no bloating  Genitourinary: no dysuria, no urinary incontinence, no urinary hesitancy, has urinary frequency, no feelings of urinary urgency, no nocturia  Musculoskeletal: no joint swelling, no joint stiffness, has joint pain, no muscle cramps, no muscle pain, no bone pain  Integument/Breast: has hair loss, no skin rashes, no skin lesions, no itching, has dry skin  Neurological: no numbness, no tingling, no weakness, no confusion, has headaches, no dizziness, no normal  Neurological: normal coordination and normal general cortical function    Visual inspection:  Deformity/amputation: absent  Skin lesions/pre-ulcerative calluses: absent  Edema: right- negative, left- negative    Sensory exam:  Monofilament sensation: normal  (minimum of 5 random plantar locations tested, avoiding callused areas - > 1 area with absence of sensation is + for neuropathy)    Plus at least one of the following:  Pulses: normal  Proprioception: Intact  Vibration (128 Hz): Impaired    ASSESSMENT/PLAN:  1. Type 1 diabetes, uncontrolled, with neuropathy (HCC)  HbA1C 10.1  10 units for BG>150, 15 unit >200, 20 units >250  Consider adding metformin for insulin resistance. - insulin aspart (NOVOLOG FLEXPEN) 100 UNIT/ML injection pen; INJECT UP 40 UNITS 3 TIMES A Day plus correction  Dispense: 40 pen; Refill: 2  - Insulin Degludec (TRESIBA FLEXTOUCH) 200 UNIT/ML SOPN; Inject 80 Units into the skin nightly  Dispense: 6 pen; Refill: 2  - Comprehensive Metabolic Panel; Future  GADA positive  C-peptide<0.1    2. Hashimoto's thyroiditis  TSH 2.4  Thyroid sonogram  TPOab, Tg ab.    3. Vitamin D deficiency  Not on supplement. Obtain 25OH vitamin D    4. Class 1 obesity with serious comorbidity and body mass index (BMI) of 34.0 to 34.9 in adult, unspecified obesity type  Diet, exercise. 5. Goiter  Thyroid sono    6. Hyperlipidemia  LDL 60  Continue Rosuvastatin    7.  Hypothyroidism  TSH 2.4  Increase levothyroxine to 0.05 mg qd    Reviewed and/or ordered clinical lab results Yes  Reviewed and/or ordered radiology tests Yes  Reviewed and/or ordered other diagnostic tests No  Discussed test results with performing physician No  Independently reviewed image, tracing, or specimen 6 pages of sensor data  Made a decision to obtain old records No  Reviewed and summarized old records Yes  Hemoglobin A1c 10.1  LDL 60  TSH 1.68  Obtained history from other than patient No    Ziggy Sloan was counseled regarding symptoms of diabetes diagnosis, course and complications of disease if inadequately treated, side effects of medications, diagnosis, treatment options, and prognosis, risks, benefits, complications, and alternatives of treatment, labs, imaging and other studies and treatment targets and goals, sensors, insulin pump, Medtronic, Tandem, Omnipod  She understands instructions and counseling. Total visit time 40 min, >50% was spent in counseling    Return in about 2 months (around 8/18/2019) for diabetes, 40 min.

## 2019-07-06 ENCOUNTER — PATIENT MESSAGE (OUTPATIENT)
Dept: ENDOCRINOLOGY | Age: 29
End: 2019-07-06

## 2019-07-18 NOTE — TELEPHONE ENCOUNTER
LOV: 6/18/19  NOV: 8/22/19    Spoke with pt and is using ~150 units daily. Pt stated she would be getting the insulin pump on Friday and is working to set up a time with RUBEN from [employer] for training. Pt will need a prescription for Novolog vials so that she can complete the traingin.

## 2019-07-23 RX ORDER — ROSUVASTATIN CALCIUM 20 MG/1
20 TABLET, COATED ORAL NIGHTLY
Qty: 90 TABLET | Refills: 0 | Status: SHIPPED | OUTPATIENT
Start: 2019-07-23 | End: 2019-10-15 | Stop reason: SDUPTHER

## 2019-08-19 ENCOUNTER — TELEPHONE (OUTPATIENT)
Dept: ENDOCRINOLOGY | Age: 29
End: 2019-08-19

## 2019-08-20 DIAGNOSIS — E55.9 VITAMIN D DEFICIENCY: ICD-10-CM

## 2019-08-20 DIAGNOSIS — E03.9 ACQUIRED HYPOTHYROIDISM: ICD-10-CM

## 2019-08-20 DIAGNOSIS — E78.00 PURE HYPERCHOLESTEROLEMIA: ICD-10-CM

## 2019-08-20 DIAGNOSIS — E06.3 HASHIMOTO'S THYROIDITIS: ICD-10-CM

## 2019-08-20 LAB
A/G RATIO: 2.4 (ref 1.1–2.2)
ALBUMIN SERPL-MCNC: 4.5 G/DL (ref 3.4–5)
ALP BLD-CCNC: 84 U/L (ref 40–129)
ALT SERPL-CCNC: 13 U/L (ref 10–40)
ANION GAP SERPL CALCULATED.3IONS-SCNC: 11 MMOL/L (ref 3–16)
AST SERPL-CCNC: 14 U/L (ref 15–37)
BILIRUB SERPL-MCNC: 0.4 MG/DL (ref 0–1)
BUN BLDV-MCNC: 7 MG/DL (ref 7–20)
CALCIUM SERPL-MCNC: 9.2 MG/DL (ref 8.3–10.6)
CHLORIDE BLD-SCNC: 100 MMOL/L (ref 99–110)
CO2: 27 MMOL/L (ref 21–32)
CREAT SERPL-MCNC: 0.6 MG/DL (ref 0.6–1.1)
GFR AFRICAN AMERICAN: >60
GFR NON-AFRICAN AMERICAN: >60
GLOBULIN: 1.9 G/DL
GLUCOSE BLD-MCNC: 183 MG/DL (ref 70–99)
POTASSIUM SERPL-SCNC: 4.2 MMOL/L (ref 3.5–5.1)
SODIUM BLD-SCNC: 138 MMOL/L (ref 136–145)
T3 FREE: 3.3 PG/ML (ref 2.3–4.2)
T4 FREE: 0.9 NG/DL (ref 0.9–1.8)
TOTAL PROTEIN: 6.4 G/DL (ref 6.4–8.2)
TSH SERPL DL<=0.05 MIU/L-ACNC: 2.14 UIU/ML (ref 0.27–4.2)

## 2019-08-22 ENCOUNTER — HOSPITAL ENCOUNTER (OUTPATIENT)
Dept: ULTRASOUND IMAGING | Age: 29
Discharge: HOME | End: 2019-08-22
Payer: COMMERCIAL

## 2019-08-22 ENCOUNTER — OFFICE VISIT (OUTPATIENT)
Dept: ENDOCRINOLOGY | Age: 29
End: 2019-08-22
Payer: COMMERCIAL

## 2019-08-22 VITALS
BODY MASS INDEX: 34.89 KG/M2 | WEIGHT: 204.4 LBS | HEIGHT: 64 IN | SYSTOLIC BLOOD PRESSURE: 112 MMHG | OXYGEN SATURATION: 99 % | DIASTOLIC BLOOD PRESSURE: 79 MMHG | HEART RATE: 68 BPM

## 2019-08-22 DIAGNOSIS — E04.9 GOITER: ICD-10-CM

## 2019-08-22 DIAGNOSIS — E06.3 HASHIMOTO'S THYROIDITIS: ICD-10-CM

## 2019-08-22 DIAGNOSIS — E66.9 CLASS 1 OBESITY WITH SERIOUS COMORBIDITY AND BODY MASS INDEX (BMI) OF 34.0 TO 34.9 IN ADULT, UNSPECIFIED OBESITY TYPE: ICD-10-CM

## 2019-08-22 DIAGNOSIS — E78.00 PURE HYPERCHOLESTEROLEMIA: ICD-10-CM

## 2019-08-22 DIAGNOSIS — E55.9 VITAMIN D DEFICIENCY: ICD-10-CM

## 2019-08-22 PROCEDURE — 3046F HEMOGLOBIN A1C LEVEL >9.0%: CPT | Performed by: INTERNAL MEDICINE

## 2019-08-22 PROCEDURE — 99215 OFFICE O/P EST HI 40 MIN: CPT | Performed by: INTERNAL MEDICINE

## 2019-08-22 PROCEDURE — G8417 CALC BMI ABV UP PARAM F/U: HCPCS | Performed by: INTERNAL MEDICINE

## 2019-08-22 PROCEDURE — 2022F DILAT RTA XM EVC RTNOPTHY: CPT | Performed by: INTERNAL MEDICINE

## 2019-08-22 PROCEDURE — G8427 DOCREV CUR MEDS BY ELIG CLIN: HCPCS | Performed by: INTERNAL MEDICINE

## 2019-08-22 PROCEDURE — 4004F PT TOBACCO SCREEN RCVD TLK: CPT | Performed by: INTERNAL MEDICINE

## 2019-08-22 PROCEDURE — 76536 US EXAM OF HEAD AND NECK: CPT

## 2019-08-22 RX ORDER — CALCIUM CARB/VITAMIN D3/VIT K1 500-100-40
1 TABLET,CHEWABLE ORAL 3 TIMES DAILY
Qty: 100 SYRINGE | Refills: 6 | Status: SHIPPED | OUTPATIENT
Start: 2019-08-22 | End: 2022-03-08

## 2019-08-22 RX ORDER — LEVOTHYROXINE SODIUM 0.07 MG/1
75 TABLET ORAL DAILY
Qty: 30 TABLET | Refills: 3 | Status: SHIPPED | OUTPATIENT
Start: 2019-08-22 | End: 2019-11-21

## 2019-08-22 NOTE — PROGRESS NOTES
temperature intolerance    /79 (Site: Left Upper Arm, Position: Sitting, Cuff Size: Large Adult)   Pulse 68   Ht 5' 4\" (1.626 m)   Wt 204 lb 6.4 oz (92.7 kg)   SpO2 99%   BMI 35.09 kg/m²    Wt Readings from Last 3 Encounters:   08/22/19 204 lb 6.4 oz (92.7 kg)   06/18/19 198 lb 3.2 oz (89.9 kg)   06/10/19 197 lb 12.8 oz (89.7 kg)     Body mass index is 35.09 kg/m².       OBJECTIVE:  Constitutional: no acute distress, well appearing and well nourished  Psychiatric: oriented to person, place and time, judgement and insight and normal, recent and remote memory and intact and mood and affect are normal  Skin: skin and subcutaneous tissue is normal without mass, normal turgor  Head and Face: examination of head and face revealed no abnormalities  Eyes: no lid or conjunctival swelling, erythema or discharge, pupils are normal, equal, round, reactive to light  Ears/Nose: external inspection of ears and nose revealed no abnormalities, hearing is grossly normal  Oropharynx/Mouth/Face: lips, tongue and gums are normal with no lesions, the voice quality was normal  Neck: neck is supple and symmetric, with midline trachea and no masses, thyroid is enlarged  Lymphatics: normal cervical lymph nodes, normal supraclavicular nodes  Pulmonary: no increased work of breathing or signs of respiratory distress, lungs are clear to auscultation  Cardiovascular: normal heart rate and rhythm, normal S1 and S2, no murmurs and pedal pulses and 2+ bilaterally, No edema  Abdomen: abdomen is soft, non-tender with no masses  Musculoskeletal: normal gait and station and exam of the digits and nails are normal  Neurological: normal coordination and normal general cortical function    Visual inspection:  Deformity/amputation: absent  Skin lesions/pre-ulcerative calluses: absent  Edema: right- negative, left- negative    Sensory exam:  Monofilament sensation: normal  (minimum of 5 random plantar locations tested, avoiding callused areas - >

## 2019-08-30 RX ORDER — LAMOTRIGINE 100 MG/1
TABLET ORAL
Qty: 30 TABLET | Refills: 2 | Status: SHIPPED | OUTPATIENT
Start: 2019-08-30 | End: 2019-10-01 | Stop reason: SDUPTHER

## 2019-10-01 ENCOUNTER — OFFICE VISIT (OUTPATIENT)
Dept: FAMILY MEDICINE CLINIC | Age: 29
End: 2019-10-01
Payer: COMMERCIAL

## 2019-10-01 VITALS
OXYGEN SATURATION: 98 % | DIASTOLIC BLOOD PRESSURE: 72 MMHG | HEIGHT: 64 IN | HEART RATE: 81 BPM | WEIGHT: 210.4 LBS | BODY MASS INDEX: 35.92 KG/M2 | SYSTOLIC BLOOD PRESSURE: 100 MMHG

## 2019-10-01 DIAGNOSIS — F31.32 BIPOLAR 1 DISORDER, DEPRESSED, MODERATE (HCC): Primary | ICD-10-CM

## 2019-10-01 DIAGNOSIS — F41.9 ANXIETY: ICD-10-CM

## 2019-10-01 PROCEDURE — G8427 DOCREV CUR MEDS BY ELIG CLIN: HCPCS | Performed by: FAMILY MEDICINE

## 2019-10-01 PROCEDURE — G8417 CALC BMI ABV UP PARAM F/U: HCPCS | Performed by: FAMILY MEDICINE

## 2019-10-01 PROCEDURE — 99213 OFFICE O/P EST LOW 20 MIN: CPT | Performed by: FAMILY MEDICINE

## 2019-10-01 PROCEDURE — 4004F PT TOBACCO SCREEN RCVD TLK: CPT | Performed by: FAMILY MEDICINE

## 2019-10-01 PROCEDURE — G8484 FLU IMMUNIZE NO ADMIN: HCPCS | Performed by: FAMILY MEDICINE

## 2019-10-01 RX ORDER — LAMOTRIGINE 100 MG/1
200 TABLET ORAL DAILY
Qty: 60 TABLET | Refills: 5 | Status: SHIPPED | OUTPATIENT
Start: 2019-10-01 | End: 2019-12-09 | Stop reason: SDUPTHER

## 2019-10-01 RX ORDER — ESCITALOPRAM OXALATE 10 MG/1
10 TABLET ORAL DAILY
Qty: 30 TABLET | Refills: 5 | Status: SHIPPED | OUTPATIENT
Start: 2019-10-01 | End: 2019-12-19

## 2019-10-05 PROBLEM — F41.9 ANXIETY: Status: ACTIVE | Noted: 2019-10-05

## 2019-10-15 RX ORDER — ROSUVASTATIN CALCIUM 20 MG/1
20 TABLET, COATED ORAL DAILY
Qty: 30 TABLET | Refills: 2 | Status: SHIPPED
Start: 2019-10-15 | End: 2020-02-11

## 2019-11-19 DIAGNOSIS — E78.00 PURE HYPERCHOLESTEROLEMIA: ICD-10-CM

## 2019-11-19 DIAGNOSIS — E04.9 GOITER: ICD-10-CM

## 2019-11-19 DIAGNOSIS — E55.9 VITAMIN D DEFICIENCY: ICD-10-CM

## 2019-11-19 LAB
A/G RATIO: 2.6 (ref 1.1–2.2)
ALBUMIN SERPL-MCNC: 5 G/DL (ref 3.4–5)
ALP BLD-CCNC: 89 U/L (ref 40–129)
ALT SERPL-CCNC: 19 U/L (ref 10–40)
ANION GAP SERPL CALCULATED.3IONS-SCNC: 13 MMOL/L (ref 3–16)
AST SERPL-CCNC: 15 U/L (ref 15–37)
BILIRUB SERPL-MCNC: 0.5 MG/DL (ref 0–1)
BUN BLDV-MCNC: 11 MG/DL (ref 7–20)
CALCIUM SERPL-MCNC: 9.1 MG/DL (ref 8.3–10.6)
CHLORIDE BLD-SCNC: 99 MMOL/L (ref 99–110)
CHOLESTEROL, TOTAL: 131 MG/DL (ref 0–199)
CO2: 24 MMOL/L (ref 21–32)
CREAT SERPL-MCNC: 0.7 MG/DL (ref 0.6–1.1)
GFR AFRICAN AMERICAN: >60
GFR NON-AFRICAN AMERICAN: >60
GLOBULIN: 1.9 G/DL
GLUCOSE BLD-MCNC: 198 MG/DL (ref 70–99)
HDLC SERPL-MCNC: 52 MG/DL (ref 40–60)
LDL CHOLESTEROL CALCULATED: 59 MG/DL
POTASSIUM SERPL-SCNC: 4.6 MMOL/L (ref 3.5–5.1)
SODIUM BLD-SCNC: 136 MMOL/L (ref 136–145)
T4 FREE: 0.8 NG/DL (ref 0.9–1.8)
TOTAL PROTEIN: 6.9 G/DL (ref 6.4–8.2)
TRIGL SERPL-MCNC: 100 MG/DL (ref 0–150)
TSH SERPL DL<=0.05 MIU/L-ACNC: 3.55 UIU/ML (ref 0.27–4.2)
VITAMIN D 25-HYDROXY: 16.3 NG/ML
VLDLC SERPL CALC-MCNC: 20 MG/DL

## 2019-11-20 LAB
ESTIMATED AVERAGE GLUCOSE: 154.2 MG/DL
HBA1C MFR BLD: 7 %

## 2019-11-21 ENCOUNTER — OFFICE VISIT (OUTPATIENT)
Dept: ENDOCRINOLOGY | Age: 29
End: 2019-11-21
Payer: COMMERCIAL

## 2019-11-21 VITALS
HEART RATE: 72 BPM | DIASTOLIC BLOOD PRESSURE: 68 MMHG | BODY MASS INDEX: 36.23 KG/M2 | HEIGHT: 64 IN | OXYGEN SATURATION: 99 % | SYSTOLIC BLOOD PRESSURE: 106 MMHG | WEIGHT: 212.2 LBS

## 2019-11-21 DIAGNOSIS — E55.9 VITAMIN D DEFICIENCY: ICD-10-CM

## 2019-11-21 DIAGNOSIS — E03.9 ACQUIRED HYPOTHYROIDISM: ICD-10-CM

## 2019-11-21 DIAGNOSIS — E06.3 HASHIMOTO'S THYROIDITIS: ICD-10-CM

## 2019-11-21 DIAGNOSIS — E04.2 MULTINODULAR GOITER: ICD-10-CM

## 2019-11-21 DIAGNOSIS — E78.00 PURE HYPERCHOLESTEROLEMIA: ICD-10-CM

## 2019-11-21 DIAGNOSIS — E66.9 CLASS 1 OBESITY WITH SERIOUS COMORBIDITY AND BODY MASS INDEX (BMI) OF 34.0 TO 34.9 IN ADULT, UNSPECIFIED OBESITY TYPE: ICD-10-CM

## 2019-11-21 PROCEDURE — G8417 CALC BMI ABV UP PARAM F/U: HCPCS | Performed by: INTERNAL MEDICINE

## 2019-11-21 PROCEDURE — G8484 FLU IMMUNIZE NO ADMIN: HCPCS | Performed by: INTERNAL MEDICINE

## 2019-11-21 PROCEDURE — G8427 DOCREV CUR MEDS BY ELIG CLIN: HCPCS | Performed by: INTERNAL MEDICINE

## 2019-11-21 PROCEDURE — 3051F HG A1C>EQUAL 7.0%<8.0%: CPT | Performed by: INTERNAL MEDICINE

## 2019-11-21 PROCEDURE — 4004F PT TOBACCO SCREEN RCVD TLK: CPT | Performed by: INTERNAL MEDICINE

## 2019-11-21 PROCEDURE — 2022F DILAT RTA XM EVC RTNOPTHY: CPT | Performed by: INTERNAL MEDICINE

## 2019-11-21 PROCEDURE — 99214 OFFICE O/P EST MOD 30 MIN: CPT | Performed by: INTERNAL MEDICINE

## 2019-11-21 RX ORDER — ERGOCALCIFEROL 1.25 MG/1
50000 CAPSULE ORAL WEEKLY
Qty: 12 CAPSULE | Refills: 1 | Status: SHIPPED | OUTPATIENT
Start: 2019-11-21 | End: 2020-03-13 | Stop reason: SDUPTHER

## 2019-11-21 RX ORDER — LAMOTRIGINE 100 MG/1
TABLET ORAL
Qty: 30 TABLET | Refills: 1 | Status: SHIPPED | OUTPATIENT
Start: 2019-11-21 | End: 2019-11-21 | Stop reason: DRUGHIGH

## 2019-11-21 RX ORDER — LEVOTHYROXINE SODIUM 88 UG/1
88 TABLET ORAL DAILY
Qty: 30 TABLET | Refills: 3 | Status: SHIPPED | OUTPATIENT
Start: 2019-11-21 | End: 2020-02-11

## 2019-11-21 RX ORDER — BLOOD-GLUCOSE METER
KIT MISCELLANEOUS
Qty: 250 STRIP | Refills: 5 | Status: SHIPPED | OUTPATIENT
Start: 2019-11-21 | End: 2020-10-31 | Stop reason: ALTCHOICE

## 2019-12-10 RX ORDER — LAMOTRIGINE 100 MG/1
200 TABLET ORAL DAILY
Qty: 60 TABLET | Refills: 5 | Status: SHIPPED | OUTPATIENT
Start: 2019-12-10 | End: 2020-10-09

## 2019-12-19 ENCOUNTER — OFFICE VISIT (OUTPATIENT)
Dept: FAMILY MEDICINE CLINIC | Age: 29
End: 2019-12-19
Payer: COMMERCIAL

## 2019-12-19 VITALS
WEIGHT: 217 LBS | HEART RATE: 90 BPM | SYSTOLIC BLOOD PRESSURE: 122 MMHG | OXYGEN SATURATION: 99 % | DIASTOLIC BLOOD PRESSURE: 80 MMHG | HEIGHT: 64 IN | BODY MASS INDEX: 37.05 KG/M2

## 2019-12-19 DIAGNOSIS — F31.32 BIPOLAR 1 DISORDER, DEPRESSED, MODERATE (HCC): ICD-10-CM

## 2019-12-19 DIAGNOSIS — F41.9 ANXIETY: Primary | ICD-10-CM

## 2019-12-19 DIAGNOSIS — Z23 FLU VACCINE NEED: ICD-10-CM

## 2019-12-19 PROCEDURE — 99214 OFFICE O/P EST MOD 30 MIN: CPT | Performed by: FAMILY MEDICINE

## 2019-12-19 PROCEDURE — G8427 DOCREV CUR MEDS BY ELIG CLIN: HCPCS | Performed by: FAMILY MEDICINE

## 2019-12-19 PROCEDURE — G8482 FLU IMMUNIZE ORDER/ADMIN: HCPCS | Performed by: FAMILY MEDICINE

## 2019-12-19 PROCEDURE — 90686 IIV4 VACC NO PRSV 0.5 ML IM: CPT | Performed by: FAMILY MEDICINE

## 2019-12-19 PROCEDURE — 1036F TOBACCO NON-USER: CPT | Performed by: FAMILY MEDICINE

## 2019-12-19 PROCEDURE — 90471 IMMUNIZATION ADMIN: CPT | Performed by: FAMILY MEDICINE

## 2019-12-19 PROCEDURE — G8417 CALC BMI ABV UP PARAM F/U: HCPCS | Performed by: FAMILY MEDICINE

## 2019-12-19 RX ORDER — FLUOXETINE 10 MG/1
10 CAPSULE ORAL DAILY
Qty: 30 CAPSULE | Refills: 3 | Status: SHIPPED | OUTPATIENT
Start: 2019-12-19 | End: 2020-04-13

## 2019-12-23 ENCOUNTER — TELEPHONE (OUTPATIENT)
Dept: FAMILY MEDICINE CLINIC | Age: 29
End: 2019-12-23

## 2020-02-06 ENCOUNTER — PATIENT MESSAGE (OUTPATIENT)
Dept: ENDOCRINOLOGY | Age: 30
End: 2020-02-06

## 2020-02-10 DIAGNOSIS — E04.2 MULTINODULAR GOITER: ICD-10-CM

## 2020-02-10 DIAGNOSIS — E06.3 HASHIMOTO'S THYROIDITIS: ICD-10-CM

## 2020-02-10 DIAGNOSIS — E78.00 PURE HYPERCHOLESTEROLEMIA: ICD-10-CM

## 2020-02-10 DIAGNOSIS — E55.9 VITAMIN D DEFICIENCY: ICD-10-CM

## 2020-02-10 DIAGNOSIS — E03.9 ACQUIRED HYPOTHYROIDISM: ICD-10-CM

## 2020-02-10 PROBLEM — Z34.90 PREGNANCY: Status: ACTIVE | Noted: 2020-02-10

## 2020-02-10 LAB
A/G RATIO: 1.8 (ref 1.1–2.2)
ALBUMIN SERPL-MCNC: 4.5 G/DL (ref 3.4–5)
ALP BLD-CCNC: 81 U/L (ref 40–129)
ALT SERPL-CCNC: 13 U/L (ref 10–40)
ANION GAP SERPL CALCULATED.3IONS-SCNC: 17 MMOL/L (ref 3–16)
AST SERPL-CCNC: 12 U/L (ref 15–37)
BILIRUB SERPL-MCNC: 0.6 MG/DL (ref 0–1)
BUN BLDV-MCNC: 8 MG/DL (ref 7–20)
CALCIUM SERPL-MCNC: 9.3 MG/DL (ref 8.3–10.6)
CHLORIDE BLD-SCNC: 104 MMOL/L (ref 99–110)
CHOLESTEROL, TOTAL: 194 MG/DL (ref 0–199)
CO2: 21 MMOL/L (ref 21–32)
CREAT SERPL-MCNC: 0.6 MG/DL (ref 0.6–1.1)
CREATININE URINE: 330.4 MG/DL (ref 28–259)
GFR AFRICAN AMERICAN: >60
GFR NON-AFRICAN AMERICAN: >60
GLOBULIN: 2.5 G/DL
GLUCOSE BLD-MCNC: 154 MG/DL (ref 70–99)
HDLC SERPL-MCNC: 47 MG/DL (ref 40–60)
LDL CHOLESTEROL CALCULATED: 130 MG/DL
MICROALBUMIN UR-MCNC: 3 MG/DL
MICROALBUMIN/CREAT UR-RTO: 9.1 MG/G (ref 0–30)
POTASSIUM SERPL-SCNC: 4.2 MMOL/L (ref 3.5–5.1)
SODIUM BLD-SCNC: 142 MMOL/L (ref 136–145)
T4 FREE: 1.2 NG/DL (ref 0.9–1.8)
TOTAL PROTEIN: 7 G/DL (ref 6.4–8.2)
TRIGL SERPL-MCNC: 83 MG/DL (ref 0–150)
TSH SERPL DL<=0.05 MIU/L-ACNC: 1.55 UIU/ML (ref 0.27–4.2)
VITAMIN D 25-HYDROXY: 36.5 NG/ML
VLDLC SERPL CALC-MCNC: 17 MG/DL

## 2020-02-11 ENCOUNTER — OFFICE VISIT (OUTPATIENT)
Dept: ENDOCRINOLOGY | Age: 30
End: 2020-02-11
Payer: COMMERCIAL

## 2020-02-11 VITALS
HEART RATE: 74 BPM | SYSTOLIC BLOOD PRESSURE: 113 MMHG | OXYGEN SATURATION: 100 % | HEIGHT: 64 IN | BODY MASS INDEX: 36.81 KG/M2 | WEIGHT: 215.6 LBS | DIASTOLIC BLOOD PRESSURE: 71 MMHG

## 2020-02-11 LAB
ESTIMATED AVERAGE GLUCOSE: 151.3 MG/DL
HBA1C MFR BLD: 6.9 %

## 2020-02-11 PROCEDURE — G8482 FLU IMMUNIZE ORDER/ADMIN: HCPCS | Performed by: INTERNAL MEDICINE

## 2020-02-11 PROCEDURE — 3044F HG A1C LEVEL LT 7.0%: CPT | Performed by: INTERNAL MEDICINE

## 2020-02-11 PROCEDURE — G8427 DOCREV CUR MEDS BY ELIG CLIN: HCPCS | Performed by: INTERNAL MEDICINE

## 2020-02-11 PROCEDURE — 1036F TOBACCO NON-USER: CPT | Performed by: INTERNAL MEDICINE

## 2020-02-11 PROCEDURE — 2022F DILAT RTA XM EVC RTNOPTHY: CPT | Performed by: INTERNAL MEDICINE

## 2020-02-11 PROCEDURE — G8417 CALC BMI ABV UP PARAM F/U: HCPCS | Performed by: INTERNAL MEDICINE

## 2020-02-11 PROCEDURE — 99215 OFFICE O/P EST HI 40 MIN: CPT | Performed by: INTERNAL MEDICINE

## 2020-02-11 RX ORDER — LEVOTHYROXINE SODIUM 112 UG/1
112 TABLET ORAL DAILY
Qty: 30 TABLET | Refills: 3 | Status: SHIPPED | OUTPATIENT
Start: 2020-02-11 | End: 2020-06-11

## 2020-02-11 NOTE — PROGRESS NOTES
Anna Ramos is a 34 y.o. female who presents for Type 1 diabetes mellitus. Current symptoms/problems include hyperglycemia and are worsening. 1.  Type 1 diabetes, uncontrolled, with neuropathy (HCC) [E10.40, E10.65]    Diagnosed with Type 1 diabetes mellitus in 2010.     Comorbid conditions: Neuropathy    Current diabetic medications include: Novolog    Intolerance to diabetes medications: No     Weight trend: stable  Prior visit with dietician: yes  Current diet: on average, 1 meal per day and other snacks  Current exercise: occasional, active     Current monitoring regimen: home blood tests - 5 times daily  Has brought blood glucose log/meter:  Yes  Home blood sugar records: fasting range: 100-150 and postprandial range: 100-150  Any episodes of hypoglycemia? Yes  Hypoglycemia frequency and time(s):  2 times a week  Does patient have Glucagon emergency kit? Yes  Does patient have rapid acting carbohydrate? Yes  Does patient wear a medic alert bracelet or necklace? No    2. Hashimoto's thyroiditis  Has fatigue. Dx in 2016.    3. Vitamin D deficiency  Has fatigue. 4. Obesity  Eats healthier. Occasional exercise. 5. Multinodular Goiter  No difficulty swallowing, hoarseness. .  No family history of thyroid cancer. No radiation to her neck. 6. Hyperlipidemia  No muscle pain    7. Hypothyroidism  Has fatigue    8.   Pregnancy  5-6 weeks  2nd pregnancy    Past Medical History:   Diagnosis Date    Bipolar 1 disorder ([address]     Diabetes mellitus ([address]     Diabetes mellitus type I ([address]     Diabetic keto-acidosis ([address] 2014    admitted at [address] disease       Patient Active Problem List   Diagnosis    Vitamin D deficiency    Hashimoto's thyroiditis    Bipolar 1 disorder, depressed, moderate (HCC)    Type 1 diabetes, uncontrolled, with neuropathy ([address]    Class 2 obesity with body mass index (BMI) of 37.0 to 37.9 in adult    Multinodular goiter    Pure hypercholesterolemia    Anxiety    Acquired hypothyroidism    Pregnancy     Past Surgical History:   Procedure Laterality Date    LAPAROSCOPY  2016    WISDOM TOOTH EXTRACTION Bilateral 2007     Social History     Socioeconomic History    Marital status: Single     Spouse name: Not on file    Number of children: Not on file    Years of education: Not on file    Highest education level: Not on file   Occupational History    Not on file   Social Needs    Financial resource strain: Not on file    Food insecurity:     Worry: Not on file     Inability: Not on file    Transportation needs:     Medical: Not on file     Non-medical: Not on file   Tobacco Use    Smoking status: Former Smoker     Years: 4.00     Last attempt to quit: 2019     Years since quittin.2    Smokeless tobacco: Never Used    Tobacco comment: social smoker, once a month   Substance and Sexual Activity    Alcohol use: Yes     Comment: once a month    Drug use: Yes     Types: Marijuana    Sexual activity: Yes   Lifestyle    Physical activity:     Days per week: Not on file     Minutes per session: Not on file    Stress: Not on file   Relationships    Social connections:     Talks on phone: Not on file     Gets together: Not on file     Attends Pent[employer]ism service: Not on file     Active member of club or organization: Not on file     Attends meetings of clubs or organizations: Not on file     Relationship status: Not on file    Intimate partner violence:     Fear of current or ex partner: Not on file     Emotionally abused: Not on file     Physically abused: Not on file     Forced sexual activity: Not on file   Other Topics Concern    Not on file   Social History Narrative    Not on file     Family History   Problem Relation Age of Onset    Diabetes type 2  Mother     Bipolar Disorder Mother     Hypertension Mother     Alcohol Abuse Father     Depression Father     Asthma Sister     Depression Brother     High no itching, has dry skin  Neurological: no numbness, no tingling, no weakness, no confusion, has headaches, no dizziness, no fainting, no tremors, has decrease in memory, no balance problems  Psychiatric: has anxiety, has depression, has insomnia  Hematologic/Lymphatic: no tendency for easy bleeding, no swollen lymph nodes, no tendency for easy bruising  Immunology: has seasonal allergies, no frequent infections, no frequent illnesses  Endocrine: no temperature intolerance    /71 (Site: Left Upper Arm, Position: Sitting, Cuff Size: Large Adult)   Pulse 74   Ht 5' 4\" (1.626 m)   Wt 215 lb 9.6 oz (97.8 kg)   LMP 01/05/2020   SpO2 100%   BMI 37.01 kg/m²    Wt Readings from Last 3 Encounters:   02/11/20 215 lb 9.6 oz (97.8 kg)   12/19/19 217 lb (98.4 kg)   11/21/19 212 lb 3.2 oz (96.3 kg)     Body mass index is 37.01 kg/m².       OBJECTIVE:  Constitutional: no acute distress, well appearing and well nourished  Psychiatric: oriented to person, place and time, judgement and insight and normal, recent and remote memory and intact and mood and affect are normal  Skin: skin and subcutaneous tissue is normal without mass, normal turgor  Head and Face: examination of head and face revealed no abnormalities  Eyes: no lid or conjunctival swelling, erythema or discharge, pupils are normal, equal, round, reactive to light  Ears/Nose: external inspection of ears and nose revealed no abnormalities, hearing is grossly normal  Oropharynx/Mouth/Face: lips, tongue and gums are normal with no lesions, the voice quality was normal  Neck: neck is supple and symmetric, with midline trachea and no masses, thyroid is enlarged  Lymphatics: normal cervical lymph nodes, normal supraclavicular nodes  Pulmonary: no increased work of breathing or signs of respiratory distress, lungs are clear to auscultation  Cardiovascular: normal heart rate and rhythm, normal S1 and S2, no murmurs and pedal pulses and 2+ bilaterally, No edema  Abdomen:

## 2020-03-15 PROBLEM — E78.49 OTHER HYPERLIPIDEMIA: Status: ACTIVE | Noted: 2020-03-15

## 2020-03-15 PROBLEM — E78.2 MIXED HYPERLIPIDEMIA: Status: ACTIVE | Noted: 2020-03-15

## 2020-03-16 ENCOUNTER — OFFICE VISIT (OUTPATIENT)
Dept: ENDOCRINOLOGY | Age: 30
End: 2020-03-16
Payer: COMMERCIAL

## 2020-03-16 VITALS
HEIGHT: 64 IN | BODY MASS INDEX: 36.16 KG/M2 | OXYGEN SATURATION: 100 % | DIASTOLIC BLOOD PRESSURE: 70 MMHG | WEIGHT: 211.8 LBS | SYSTOLIC BLOOD PRESSURE: 108 MMHG | HEART RATE: 62 BPM

## 2020-03-16 DIAGNOSIS — E06.3 HASHIMOTO'S THYROIDITIS: ICD-10-CM

## 2020-03-16 LAB
T4 FREE: 1 NG/DL (ref 0.9–1.8)
TSH SERPL DL<=0.05 MIU/L-ACNC: 0.68 UIU/ML (ref 0.27–4.2)

## 2020-03-16 PROCEDURE — G8417 CALC BMI ABV UP PARAM F/U: HCPCS | Performed by: INTERNAL MEDICINE

## 2020-03-16 PROCEDURE — 1036F TOBACCO NON-USER: CPT | Performed by: INTERNAL MEDICINE

## 2020-03-16 PROCEDURE — 2022F DILAT RTA XM EVC RTNOPTHY: CPT | Performed by: INTERNAL MEDICINE

## 2020-03-16 PROCEDURE — 3044F HG A1C LEVEL LT 7.0%: CPT | Performed by: INTERNAL MEDICINE

## 2020-03-16 PROCEDURE — G8482 FLU IMMUNIZE ORDER/ADMIN: HCPCS | Performed by: INTERNAL MEDICINE

## 2020-03-16 PROCEDURE — G8427 DOCREV CUR MEDS BY ELIG CLIN: HCPCS | Performed by: INTERNAL MEDICINE

## 2020-03-16 PROCEDURE — 99215 OFFICE O/P EST HI 40 MIN: CPT | Performed by: INTERNAL MEDICINE

## 2020-03-16 NOTE — PROGRESS NOTES
Acquired hypothyroidism    Pregnancy    Other hyperlipidemia     Past Surgical History:   Procedure Laterality Date    LAPAROSCOPY  2016    WISDOM TOOTH EXTRACTION Bilateral 2007     Social History     Socioeconomic History    Marital status: Single     Spouse name: Not on file    Number of children: Not on file    Years of education: Not on file    Highest education level: Not on file   Occupational History    Not on file   Social Needs    Financial resource strain: Not on file    Food insecurity     Worry: Not on file     Inability: Not on file    Transportation needs     Medical: Not on file     Non-medical: Not on file   Tobacco Use    Smoking status: Former Smoker     Years: 4.00     Last attempt to quit: 2019     Years since quittin.3    Smokeless tobacco: Never Used    Tobacco comment: social smoker, once a month   Substance and Sexual Activity    Alcohol use: Yes     Comment: once a month    Drug use: Yes     Types: Marijuana    Sexual activity: Yes   Lifestyle    Physical activity     Days per week: Not on file     Minutes per session: Not on file    Stress: Not on file   Relationships    Social connections     Talks on phone: Not on file     Gets together: Not on file     Attends Nondenominational service: Not on file     Active member of club or organization: Not on file     Attends meetings of clubs or organizations: Not on file     Relationship status: Not on file    Intimate partner violence     Fear of current or ex partner: Not on file     Emotionally abused: Not on file     Physically abused: Not on file     Forced sexual activity: Not on file   Other Topics Concern    Not on file   Social History Narrative    Not on file     Family History   Problem Relation Age of Onset    Diabetes type 2  Mother     Bipolar Disorder Mother     Hypertension Mother     Alcohol Abuse Father     Depression Father     Asthma Sister     Depression Brother     High Cholesterol Brother      Current Outpatient Medications   Medication Sig Dispense Refill    insulin aspart (NOVOLOG) 100 UNIT/ML injection vial 150 units daily, use in insulin pump 5 vial 3    levothyroxine (SYNTHROID) 112 MCG tablet Take 1 tablet by mouth Daily 30 tablet 3    insulin NPH (HUMULIN N KWIKPEN) 100 UNIT/ML injection pen Inject 10 Units into the skin nightly 5 pen 3    FLUoxetine (PROZAC) 10 MG capsule Take 1 capsule by mouth daily 30 capsule 3    lamoTRIgine (LAMICTAL) 100 MG tablet Take 2 tablets by mouth daily 60 tablet 5    FREESTYLE LITE strip 8 times daily 250 strip 5    vitamin D (ERGOCALCIFEROL) 1.25 MG (48031 UT) CAPS capsule Take 1 capsule by mouth once a week 12 capsule 1    blood glucose test strips (CONTOUR NEXT TEST) strip 1 each by In Vitro route 5 times daily As needed. 150 each 5    Insulin Syringe-Needle U-100 (INSULIN SYRINGE 1CC/31GX5/16\") 31G X 5/16\" 1 ML MISC 1 each by Does not apply route 3 times daily 100 Syringe 6    glucagon 1 MG injection Inject 1 mg into the muscle See Admin Instructions Follow package directions for low blood sugar. 1 kit 3    acetone, urine, test (KETOSTIX) strip Check urine for ketones if blood glucose is > 250 30 strip 3     No current facility-administered medications for this visit.       No Known Allergies  Family Status   Relation Name Status    Mother  Alive    Father  Alive    Sister  Alive    Brother  Alive       Lab Review:    Lab Results   Component Value Date    WBC 5.7 06/04/2013    HGB 12.2 06/04/2013    HCT 36.9 06/04/2013    MCV 86.5 06/04/2013    PLT COM 06/04/2013     Lab Results   Component Value Date     02/10/2020    K 4.2 02/10/2020     02/10/2020    CO2 21 02/10/2020    BUN 8 02/10/2020    CREATININE 0.6 02/10/2020    GLUCOSE 154 02/10/2020    CALCIUM 9.3 02/10/2020    PROT 7.0 02/10/2020    LABALBU 4.5 02/10/2020    BILITOT 0.6 02/10/2020    ALKPHOS 81 02/10/2020    AST 12 02/10/2020    ALT 13 02/10/2020    LABGLOM >60

## 2020-03-18 RX ORDER — ERGOCALCIFEROL 1.25 MG/1
50000 CAPSULE ORAL WEEKLY
Qty: 12 CAPSULE | Refills: 1 | Status: SHIPPED | OUTPATIENT
Start: 2020-03-18 | End: 2020-03-18

## 2020-04-08 ENCOUNTER — PATIENT MESSAGE (OUTPATIENT)
Dept: ENDOCRINOLOGY | Age: 30
End: 2020-04-08

## 2020-04-09 NOTE — TELEPHONE ENCOUNTER
I spoke with patient. She denies nausea, vomiting. She had plenty of fluids and changed pump site, which helped. Her blood glucose is down to 100-120. Continue observation.

## 2020-04-13 RX ORDER — FLUOXETINE 10 MG/1
10 CAPSULE ORAL DAILY
Qty: 30 CAPSULE | Refills: 2 | Status: SHIPPED | OUTPATIENT
Start: 2020-04-13 | End: 2020-07-09

## 2020-04-15 NOTE — PROGRESS NOTES
Melissa Ashford is a 27 y.o. female who presents for Type 1 diabetes mellitus. Current symptoms/problems include hyperglycemia and are unchanged. 2020    TELEHEALTH EVALUATION -- Audio/Visual (During WYP-99 public health emergency)    HPI:    Melissa Ashford (:  1990) has requested an audio/video evaluation for the following concern(s):      1. Type 1 diabetes, uncontrolled, with neuropathy (HCC) [E10.40, E10.65]    Diagnosed with Type 1 diabetes mellitus in .     Comorbid conditions: Neuropathy    Current diabetic medications include: Novolog  Medtronic Minimed Guardian CGM  Intolerance to diabetes medications: No     Weight trend: stable  Prior visit with dietician: yes  Current diet: on average, 1 meal per day and other snacks  Current exercise: occasional, active     Current monitoring regimen: home blood tests - 5 times daily  Has brought blood glucose log/meter:  Yes  Home blood sugar records: fasting range:  and postprandial range:   Any episodes of hypoglycemia? Yes  Hypoglycemia frequency and time(s):  2 times a week  Does patient have Glucagon emergency kit? Yes  Does patient have rapid acting carbohydrate? Yes  Does patient wear a medic alert bracelet or necklace? No    2. Hashimoto's thyroiditis  Has fatigue. Dx in 2016.    3. Vitamin D deficiency  Has fatigue. 4. Obesity  Eats healthier. Occasional exercise. 5. Multinodular Goiter  No difficulty swallowing, hoarseness. .  No family history of thyroid cancer. No radiation to her neck. 6. Hyperlipidemia  No muscle pain    7. Hypothyroidism  Has fatigue    8.   Pregnancy  13 weeks  2nd pregnancy    Past Medical History:   Diagnosis Date    Bipolar 1 disorder ([address]     Diabetes mellitus ([address]     Diabetes mellitus type I ([address]     Diabetic keto-acidosis ([address]     admitted at [address] disease       Patient Active Problem List   Diagnosis    Vitamin D deficiency    Hashimoto's thyroiditis    Bipolar 1 disorder, depressed, moderate (HCC)    Type 1 diabetes, uncontrolled, with neuropathy (HCC)    Class 2 obesity with body mass index (BMI) of 36.0 to 36.9 in adult    Multinodular goiter    Anxiety    Acquired hypothyroidism    Pregnancy    Other hyperlipidemia     Past Surgical History:   Procedure Laterality Date    LAPAROSCOPY  2016    WISDOM TOOTH EXTRACTION Bilateral 2007     Social History     Socioeconomic History    Marital status: Single     Spouse name: Not on file    Number of children: Not on file    Years of education: Not on file    Highest education level: Not on file   Occupational History    Not on file   Social Needs    Financial resource strain: Not on file    Food insecurity     Worry: Not on file     Inability: Not on file    Transportation needs     Medical: Not on file     Non-medical: Not on file   Tobacco Use    Smoking status: Former Smoker     Years: 4.00     Last attempt to quit: 2019     Years since quittin.4    Smokeless tobacco: Never Used    Tobacco comment: social smoker, once a month   Substance and Sexual Activity    Alcohol use: Yes     Comment: once a month    Drug use: Yes     Types: Marijuana    Sexual activity: Yes   Lifestyle    Physical activity     Days per week: Not on file     Minutes per session: Not on file    Stress: Not on file   Relationships    Social connections     Talks on phone: Not on file     Gets together: Not on file     Attends [employer] service: Not on file     Active member of club or organization: Not on file     Attends meetings of clubs or organizations: Not on file     Relationship status: Not on file    Intimate partner violence     Fear of current or ex partner: Not on file     Emotionally abused: Not on file     Physically abused: Not on file     Forced sexual activity: Not on file   Other Topics Concern    Not on file   Social History Narrative    Not on file Family History   Problem Relation Age of Onset    Diabetes type 2  Mother     Bipolar Disorder Mother     Hypertension Mother     Alcohol Abuse Father     Depression Father     Asthma Sister     Depression Brother     High Cholesterol Brother      Current Outpatient Medications   Medication Sig Dispense Refill    insulin aspart (NOVOLOG) 100 UNIT/ML injection vial 150 units daily, use in insulin pump 5 vial 3    FLUoxetine (PROZAC) 10 MG capsule Take 1 capsule by mouth daily 30 capsule 2    vitamin D (ERGOCALCIFEROL) 1.25 MG (49173 UT) CAPS capsule TAKE ONE CAPSULE BY MOUTH ONCE WEEKLY 4 capsule 2    levothyroxine (SYNTHROID) 112 MCG tablet Take 1 tablet by mouth Daily 30 tablet 3    insulin NPH (HUMULIN N KWIKPEN) 100 UNIT/ML injection pen Inject 10 Units into the skin nightly 5 pen 3    lamoTRIgine (LAMICTAL) 100 MG tablet Take 2 tablets by mouth daily 60 tablet 5    FREESTYLE LITE strip 8 times daily 250 strip 5    blood glucose test strips (CONTOUR NEXT TEST) strip 1 each by In Vitro route 5 times daily As needed. 150 each 5    Insulin Syringe-Needle U-100 (INSULIN SYRINGE 1CC/31GX5/16\") 31G X 5/16\" 1 ML MISC 1 each by Does not apply route 3 times daily 100 Syringe 6    glucagon 1 MG injection Inject 1 mg into the muscle See Admin Instructions Follow package directions for low blood sugar. 1 kit 3    acetone, urine, test (KETOSTIX) strip Check urine for ketones if blood glucose is > 250 30 strip 3     No current facility-administered medications for this visit.       No Known Allergies  Family Status   Relation Name Status    Mother  Alive    Father  Alive    Sister  Alive    Brother  Alive       Lab Review:    Lab Results   Component Value Date    WBC 5.7 06/04/2013    HGB 12.2 06/04/2013    HCT 36.9 06/04/2013    MCV 86.5 06/04/2013    PLT COM 06/04/2013     Lab Results   Component Value Date     02/10/2020    K 4.2 02/10/2020     02/10/2020    CO2 21 02/10/2020    BUN 8 sonogram  TPOab, Tg ab.    3. Vitamin D deficiency  25 hydroxy vitamin D 16.3-36.5  Vitamin D 50,000 IU weekly  Obtain 25OH vitamin D    4. Obesity  Diet, exercise. 5. Multinodular Goiter  TSH 1.5-0.68  Right 5 mm and left 3 mm colloid nodules/cysts  Thyroid sono    6. Hyperlipidemia  LDL 60-  Stopped Rosuvastatin, pregnancy    7. Hypothyroidism  Call for results  Has palpitations  TSH 2.4-2.1-3.55-1.5-0.68  Levothyroxine 0.112 mg qd    8. Pregnancy  13 weeks pregnant  2nd pregnancy  Her girl is 9yo      Reviewed and/or ordered clinical lab results Yes  Reviewed and/or ordered radiology tests Yes  Reviewed and/or ordered other diagnostic tests No  Discussed test results with performing physician No  Independently reviewed image, tracing, or specimen 8 pages of tracing data  Made a decision to obtain old records No  Reviewed and summarized old records Yes  Hemoglobin A1c 10.1  LDL 60  TSH 1.68  Obtained history from other than patient No    Tammy Webb was counseled regarding symptoms of diabetes diagnosis, course and complications of disease if inadequately treated, side effects of medications, diagnosis, treatment options, and prognosis, risks, benefits, complications, and alternatives of treatment, labs, imaging and other studies and treatment targets and goals, sensors, insulin pump, Medtronic, pregnancy and pump, targets, goals, importance of diabetes control, risks of pump malfunctioning, thyroid adjustments  She understands instructions and counseling. This was a video visit, including two-way audio and video communication, in lieu of an in-person visit due to coronavirus emergency. Patient provided verbal consent to use the video visit. I conducted an interview, performed a limited exam by video and educated the patient on my assessment and plan.       Pursuant to the emergency declaration under the [address] [facility] [facility] [facility] waiver authority and the Coronavirus Preparedness and Response Supplemental Appropriations Act, this Virtual  Visit was conducted, with patient's consent, to reduce the patient's risk of exposure to COVID-19 and provide continuity of care for an established patient. Services were provided through a video synchronous discussion virtually to substitute for in-person clinic visit. Return in about 5 weeks (around 5/21/2020) for thyroid problems, diabetes.

## 2020-04-16 ENCOUNTER — VIRTUAL VISIT (OUTPATIENT)
Dept: ENDOCRINOLOGY | Age: 30
End: 2020-04-16
Payer: COMMERCIAL

## 2020-04-16 ENCOUNTER — TELEPHONE (OUTPATIENT)
Dept: ENDOCRINOLOGY | Age: 30
End: 2020-04-16

## 2020-04-16 PROCEDURE — 99214 OFFICE O/P EST MOD 30 MIN: CPT | Performed by: INTERNAL MEDICINE

## 2020-04-16 PROCEDURE — 3044F HG A1C LEVEL LT 7.0%: CPT | Performed by: INTERNAL MEDICINE

## 2020-04-16 PROCEDURE — 2022F DILAT RTA XM EVC RTNOPTHY: CPT | Performed by: INTERNAL MEDICINE

## 2020-04-16 PROCEDURE — G8427 DOCREV CUR MEDS BY ELIG CLIN: HCPCS | Performed by: INTERNAL MEDICINE

## 2020-04-16 NOTE — TELEPHONE ENCOUNTER
Called pt and advised e-mail was sent. Pt stated she does not know her user name and password. Pt was advised to call [employer] to get her information. Pt will call back when she has everything set up.

## 2020-04-17 ENCOUNTER — PROCEDURE VISIT (OUTPATIENT)
Dept: ENDOCRINOLOGY | Age: 30
End: 2020-04-17
Payer: COMMERCIAL

## 2020-04-17 PROCEDURE — 95251 CONT GLUC MNTR ANALYSIS I&R: CPT | Performed by: INTERNAL MEDICINE

## 2020-04-17 NOTE — TELEPHONE ENCOUNTER
Pt left a vm stating she downloaded her sugar reading but didn't know how to share w/ us  CB# [phone]

## 2020-04-17 NOTE — PROGRESS NOTES
CGMS Download Review and Recommendations    ENOVIX 670G Guardian personal CGMS data downloaded and reviewed. Average glucose 141+- 50 SD:  Time in range:  75%  Time above 180:  20%  Time under 70:  5%     Basal pattern review: fluctuating BG in the mornings  Postprandial pattern review: hyperglycemia after dinner mostly  Hypoglycemia review: overnight and after correcting high BG      Based on the data, I recommend:    1. Balance micronutrients with appropriate amount of carbohydrates, protein and healthy fats. Presently carbohydrates dominate the meal.  The reason is that patient has poor appetite and eats when she feels that her appetite is better. 2.  Postprandial hyperglycemia after dinner, recommend to balance better her  macronutrients     3. Eat dinner earlier to avoid nocturnal and morning hyperglycemia    4. Overall doing well, using auto mode 97 %/week, wearing sensor 91%, changing site every 2.6 days. Overall decreased use for insulin compared with the previous requirements.

## 2020-05-08 LAB
A/G RATIO: 1.6 (CALC) (ref 1–2.5)
ALBUMIN SERPL-MCNC: 3.8 G/DL (ref 3.6–5.1)
ALP BLD-CCNC: 67 U/L (ref 31–125)
ALT SERPL-CCNC: 9 U/L (ref 6–29)
AST SERPL-CCNC: 11 U/L (ref 10–30)
BILIRUB SERPL-MCNC: 0.4 MG/DL (ref 0.2–1.2)
BUN / CREAT RATIO: 11 (CALC) (ref 6–22)
BUN BLDV-MCNC: 6 MG/DL (ref 7–25)
CALCIUM SERPL-MCNC: 8.9 MG/DL (ref 8.6–10.2)
CHLORIDE BLD-SCNC: 103 MMOL/L (ref 98–110)
CO2: 27 MMOL/L (ref 20–32)
CREAT SERPL-MCNC: 0.57 MG/DL (ref 0.5–1.1)
GFR AFRICAN AMERICAN: 144 ML/MIN/1.73M2
GFR, ESTIMATED: 124 ML/MIN/1.73M2
GLOBULIN: 2.4 G/DL (CALC) (ref 1.9–3.7)
GLUCOSE BLD-MCNC: 90 MG/DL (ref 65–99)
HBA1C MFR BLD: 6.4 % OF TOTAL HGB
POTASSIUM SERPL-SCNC: 3.9 MMOL/L (ref 3.5–5.3)
SODIUM BLD-SCNC: 137 MMOL/L (ref 135–146)
T4 FREE: 1.2 NG/DL (ref 0.8–1.8)
TOTAL PROTEIN: 6.2 G/DL (ref 6.1–8.1)
TSH ULTRASENSITIVE: 0.67 MIU/L
VITAMIN D 25-HYDROXY: 67 NG/ML (ref 30–100)

## 2020-05-15 ENCOUNTER — TELEMEDICINE (OUTPATIENT)
Dept: ENDOCRINOLOGY | Age: 30
End: 2020-05-15
Payer: COMMERCIAL

## 2020-05-15 PROCEDURE — 2022F DILAT RTA XM EVC RTNOPTHY: CPT | Performed by: INTERNAL MEDICINE

## 2020-05-15 PROCEDURE — 3044F HG A1C LEVEL LT 7.0%: CPT | Performed by: INTERNAL MEDICINE

## 2020-05-15 PROCEDURE — 95251 CONT GLUC MNTR ANALYSIS I&R: CPT | Performed by: INTERNAL MEDICINE

## 2020-05-15 PROCEDURE — 99214 OFFICE O/P EST MOD 30 MIN: CPT | Performed by: INTERNAL MEDICINE

## 2020-05-15 PROCEDURE — G8427 DOCREV CUR MEDS BY ELIG CLIN: HCPCS | Performed by: INTERNAL MEDICINE

## 2020-05-15 RX ORDER — ERGOCALCIFEROL 1.25 MG/1
CAPSULE ORAL
Qty: 4 CAPSULE | Refills: 2
Start: 2020-05-15 | End: 2020-10-19

## 2020-05-15 NOTE — PROGRESS NOTES
Sol Desai is a 27 y.o. female who presents for Type 1 diabetes mellitus. Current symptoms/problems include hyperglycemia and are unchanged. 5/15/2020    TELEHEALTH EVALUATION -- Audio/Visual (During  public health emergency)    HPI:    Sol Desai (:  1990) has requested an audio/video evaluation for the following concern(s):      1. Type 1 diabetes, uncontrolled, with neuropathy (HCC) [E10.40, E10.65]    Diagnosed with Type 1 diabetes mellitus in .     Comorbid conditions: Neuropathy    Current diabetic medications include: Novolog  Medtronic Minimed Guardian CGM  Intolerance to diabetes medications: No  Sees Maternal fetal Medicine at [facility]    Weight trend: stable  Prior visit with dietician: yes  Current diet: on average, 1 meal per day and other snacks  Current exercise: occasional, active     Current monitoring regimen: home blood tests - 5 times daily  Has brought blood glucose log/meter:  Yes  Home blood sugar records: fasting range: 70-90-95 and postprandial range:   Any episodes of hypoglycemia? Yes  Hypoglycemia frequency and time(s):  2 times a week  Does patient have Glucagon emergency kit? Yes  Does patient have rapid acting carbohydrate? Yes  Does patient wear a medic alert bracelet or necklace? No    2. Hashimoto's thyroiditis  Has fatigue. Dx in 2016.    3. Vitamin D deficiency  Has fatigue. 4. Obesity  Eats healthier. Occasional exercise. 5. Multinodular Goiter  No difficulty swallowing, hoarseness. .  No family history of thyroid cancer. No radiation to her neck. 6. Hyperlipidemia  No muscle pain    7. Hypothyroidism  Has fatigue    8.   Pregnancy  18 weeks  2nd pregnancy    Thyroid ultrasound       HISTORY: Hashimoto's thyroiditis       No prior studies for review       Right thyroid lobe 1.1 x 1.2 x 5.4 cm       Left lobe 1.2 x 1.4 x 4.9 cm       Thyroid isthmus less than 3 mm       Thyroid parenchymal echotexture is diffusely heterogeneous, Sexual activity: Yes   Lifestyle    Physical activity     Days per week: Not on file     Minutes per session: Not on file    Stress: Not on file   Relationships    Social connections     Talks on phone: Not on file     Gets together: Not on file     Attends Latter day service: Not on file     Active member of club or organization: Not on file     Attends meetings of clubs or organizations: Not on file     Relationship status: Not on file    Intimate partner violence     Fear of current or ex partner: Not on file     Emotionally abused: Not on file     Physically abused: Not on file     Forced sexual activity: Not on file   Other Topics Concern    Not on file   Social History Narrative    Not on file     Family History   Problem Relation Age of Onset    Diabetes type 2  Mother     Bipolar Disorder Mother     Hypertension Mother     Alcohol Abuse Father     Depression Father     Asthma Sister     Depression Brother     High Cholesterol Brother      Current Outpatient Medications   Medication Sig Dispense Refill    vitamin D (ERGOCALCIFEROL) 1.25 MG (75183 UT) CAPS capsule TAKE ONE CAPSULE BY MOUTH every 2 weeks 4 capsule 2    insulin aspart (NOVOLOG) 100 UNIT/ML injection vial 150 units daily, use in insulin pump 5 vial 3    FLUoxetine (PROZAC) 10 MG capsule Take 1 capsule by mouth daily 30 capsule 2    levothyroxine (SYNTHROID) 112 MCG tablet Take 1 tablet by mouth Daily 30 tablet 3    insulin NPH (HUMULIN N KWIKPEN) 100 UNIT/ML injection pen Inject 10 Units into the skin nightly 5 pen 3    lamoTRIgine (LAMICTAL) 100 MG tablet Take 2 tablets by mouth daily 60 tablet 5    FREESTYLE LITE strip 8 times daily 250 strip 5    Insulin Syringe-Needle U-100 (INSULIN SYRINGE 1CC/31GX5/16\") 31G X 5/16\" 1 ML MISC 1 each by Does not apply route 3 times daily 100 Syringe 6    glucagon 1 MG injection Inject 1 mg into the muscle See Admin Instructions Follow package directions for low blood sugar.  1 kit 3    acetone, urine, test (KETOSTIX) strip Check urine for ketones if blood glucose is > 250 30 strip 3     No current facility-administered medications for this visit.       No Known Allergies  Family Status   Relation Name Status    Mother  Alive    Father  Alive    Sister  Alive    Brother  Alive       Lab Review:    Lab Results   Component Value Date    WBC 5.7 06/04/2013    HGB 12.2 06/04/2013    HCT 36.9 06/04/2013    MCV 86.5 06/04/2013    PLT COM 06/04/2013     Lab Results   Component Value Date     05/07/2020    K 3.9 05/07/2020     05/07/2020    CO2 27 05/07/2020    BUN 6 05/07/2020    CREATININE 0.57 05/07/2020    GLUCOSE 90 05/07/2020    CALCIUM 8.9 05/07/2020    PROT 6.2 05/07/2020    LABALBU 3.8 05/07/2020    BILITOT 0.4 05/07/2020    ALKPHOS 67 05/07/2020    AST 11 05/07/2020    ALT 9 05/07/2020    LABGLOM >60 02/10/2020    GFRAA 144 05/07/2020    GFRAA 177 06/04/2013    AGRATIO 1.6 05/07/2020    GLOB 2.4 05/07/2020     Lab Results   Component Value Date    TSH 0.67 05/07/2020    TSH 0.68 03/16/2020    FT3 3.3 08/20/2019     Lab Results   Component Value Date    LABA1C 6.4 05/07/2020     Lab Results   Component Value Date    .3 02/10/2020     Lab Results   Component Value Date    CHOL 194 02/10/2020     Lab Results   Component Value Date    TRIG 83 02/10/2020     Lab Results   Component Value Date    HDL 47 02/10/2020     Lab Results   Component Value Date    LDLCALC 130 02/10/2020     Lab Results   Component Value Date    LABVLDL 17 02/10/2020     No results found for: [facility]  Lab Results   Component Value Date    LABMICR 3.00 02/10/2020     Lab Results   Component Value Date    VITD25 67 05/07/2020        Review of Systems:  Constitutional: has fatigue, no fever, no recent weight gain, no recent weight loss, no changes in appetite  Eyes: no eye pain, no change in vision, no eye redness, no eye irritation, no double vision  Ears, nose, throat: has nasal congestion, no sore throat, no earache, no decrease in hearing, no hoarseness, no dry mouth, has sinus problems, no difficulty swallowing, no neck lumps, no dental problems, no mouth sores, no ringing in ears  Pulmonary: no shortness of breath, no wheezing, no dyspnea on exertion, no cough  Cardiovascular: no chest pain, has lower extremity edema, no orthopnea, no intermittent leg claudication, has palpitations  Gastrointestinal: no abdominal pain, has nausea, no vomiting, has diarrhea, has constipation, no dysphagia, no heartburn, no bloating  Genitourinary: no dysuria, no urinary incontinence, no urinary hesitancy, has urinary frequency, no feelings of urinary urgency, no nocturia  Musculoskeletal: no joint swelling, no joint stiffness, has joint pain, no muscle cramps, no muscle pain, no bone pain  Integument/Breast: has hair loss, no skin rashes, no skin lesions, no itching, has dry skin  Neurological: no numbness, no tingling, no weakness, no confusion, has headaches, no dizziness, no fainting, no tremors, has decrease in memory, no balance problems  Psychiatric: has anxiety, has depression, has insomnia  Hematologic/Lymphatic: no tendency for easy bleeding, no swollen lymph nodes, no tendency for easy bruising  Immunology: has seasonal allergies, no frequent infections, no frequent illnesses  Endocrine: no temperature intolerance    OBJECTIVE:  Constitutional: no acute distress, well appearing and well nourished  Psychiatric: oriented to person, place and time, judgement and insight and normal, recent and remote memory and intact and mood and affect are normal  Skin: skin inspection appears normal  Head and Face: head and face inspection revealed no abnormalities  Eyes: no lid or conjunctival swelling, erythema or discharge  Ears/Nose: external inspection of ears and nose revealed no abnormalities, hearing is grossly normal  Oropharynx/Mouth/Face: lips are normal with no lesions, the voice quality was normal  Neck: neck is

## 2020-05-18 RX ORDER — PERPHENAZINE 16 MG/1
1 TABLET, FILM COATED ORAL
Qty: 240 EACH | Refills: 5 | Status: SHIPPED | OUTPATIENT
Start: 2020-05-18 | End: 2021-07-26

## 2020-06-11 RX ORDER — LEVOTHYROXINE SODIUM 112 UG/1
TABLET ORAL
Qty: 30 TABLET | Refills: 2 | Status: SHIPPED | OUTPATIENT
Start: 2020-06-11 | End: 2020-09-29

## 2020-06-29 NOTE — TELEPHONE ENCOUNTER
Paperwork and office notes received.  Completed forms and Dr. Harleen Jones signed them and they were faxed back to [address] [facility] [place], [place]

## 2020-07-10 RX ORDER — FLUOXETINE 10 MG/1
10 CAPSULE ORAL DAILY
Qty: 30 CAPSULE | Refills: 2 | Status: SHIPPED | OUTPATIENT
Start: 2020-07-10 | End: 2020-10-07

## 2020-07-14 ENCOUNTER — PATIENT MESSAGE (OUTPATIENT)
Dept: ENDOCRINOLOGY | Age: 30
End: 2020-07-14

## 2020-07-14 NOTE — TELEPHONE ENCOUNTER
I spoke with patient. She feels palpitations which are uncomfortable. She had blood drawn in OB/GYN office and will notify me as soon as she gets results. I asked her to call with results or send results via my chart and call to make sure that they havebeen forwarded to me. Also instructed her to take only half of the levothyroxine tablet tomorrow.

## 2020-07-14 NOTE — TELEPHONE ENCOUNTER
From: Hector Rojas  To: Marissa Devi MD  Sent: 7/14/2020 3:00 PM EDT  Subject: Prescription Question    Hello! I have noticed that over the last month, but especially over the last 2 weeks, I have been having lots of heart palpitations throughout the day. I know that can be a potential side effect of my thyroid medication. It is happening daily, and it's starting to concern me. Any advice? Thank you for your time!

## 2020-07-30 ENCOUNTER — VIRTUAL VISIT (OUTPATIENT)
Dept: ENDOCRINOLOGY | Age: 30
End: 2020-07-30
Payer: COMMERCIAL

## 2020-07-30 ENCOUNTER — TELEPHONE (OUTPATIENT)
Dept: ENDOCRINOLOGY | Age: 30
End: 2020-07-30

## 2020-07-30 PROCEDURE — G8427 DOCREV CUR MEDS BY ELIG CLIN: HCPCS | Performed by: INTERNAL MEDICINE

## 2020-07-30 PROCEDURE — 99214 OFFICE O/P EST MOD 30 MIN: CPT | Performed by: INTERNAL MEDICINE

## 2020-07-30 PROCEDURE — 2022F DILAT RTA XM EVC RTNOPTHY: CPT | Performed by: INTERNAL MEDICINE

## 2020-07-30 PROCEDURE — 3044F HG A1C LEVEL LT 7.0%: CPT | Performed by: INTERNAL MEDICINE

## 2020-07-30 NOTE — PROGRESS NOTES
Vishal Parikh is a 27 y.o. female who presents for Type 1 diabetes mellitus. Current symptoms/problems include hyperglycemia and are unchanged. 2020    TELEHEALTH EVALUATION -- Audio/Visual (During [facility]-68 public health emergency)    HPI:    Vishal Parikh (:  1990) has requested an audio/video evaluation for the following concern(s):      1. Type 1 diabetes, uncontrolled, with neuropathy (HCC) [E10.40, E10.65]    Diagnosed with Type 1 diabetes mellitus in .     Comorbid conditions: Neuropathy    Current diabetic medications include: Novolog  Medtronic Minimed Guardian CGM  Intolerance to diabetes medications: No  Sees Maternal fetal Medicine at [facility]    Weight trend: stable  Prior visit with dietician: yes  Current diet: on average, 1 meal per day and other snacks  Current exercise: occasional, active     Current monitoring regimen: home blood tests - 5 times daily  Has brought blood glucose log/meter:  Yes  Home blood sugar records: fasting range: 70-90-95 and postprandial range:   Any episodes of hypoglycemia? Yes  Hypoglycemia frequency and time(s):  4-5 times a week  Does patient have Glucagon emergency kit? Yes  Does patient have rapid acting carbohydrate? Yes  Does patient wear a medic alert bracelet or necklace? No    2. Hashimoto's thyroiditis  Has fatigue. Dx in 2016.    3. Vitamin D deficiency  Has fatigue. 4. Obesity  Eats healthier. Occasional exercise. 5. Multinodular Goiter  No difficulty swallowing, hoarseness. .  No family history of thyroid cancer. No radiation to her neck. 6. Hyperlipidemia  No muscle pain    7. Hypothyroidism  Has fatigue    8.   Pregnancy  29 weeks  Due date 10/11/2020  2nd pregnancy    Thyroid ultrasound       HISTORY: Hashimoto's thyroiditis       No prior studies for review       Right thyroid lobe 1.1 x 1.2 x 5.4 cm       Left lobe 1.2 x 1.4 x 4.9 cm       Thyroid isthmus less than 3 mm       Thyroid parenchymal echotexture is diffusely heterogeneous, nonspecific.       Located within the right lobe is a 5 x 4 x 3 mm hypoechoic nodule or colloid cyst.       Located within the left lobe is a similar 3 x 3 x 2 mm nodule or colloid cyst. No dominant lesion identified.  No extrathyroidal mass           Impression       Suspected 2 tiny colloid cysts.       Nonspecific parenchymal echotexture which can be associated with thyroiditis.       Order History         Past Medical History:   Diagnosis Date    [facility]     Diabetes mellitus ([facility]     Diabetes mellitus type I ([facility]     Diabetic keto-acidosis ([facility]     ad[facility] at [address] [facility] disease       Patient Active Problem List   Diagnosis    Vitamin D deficiency    Hashimoto's thyroiditis    Bipolar 1 disorder, depressed, moderate (HCC)    Type 1 diabetes, uncontrolled, with neuropathy ([facility]    Class 2 obesity with body mass index (BMI) of 36.0 to 36.9 in adult    Multinodular goiter    Anxiety    Acquired hypothyroidism    Pregnancy    Other hyperlipidemia     Past Surgical History:   Procedure Laterality Date    LAPAROSCOPY  2016    WISDOM TOOTH EXTRACTION Bilateral      Social History     Socioeconomic History    Marital status: Single     Spouse name: Not on file    Number of children: Not on file    Years of education: Not on file    Highest education level: Not on file   Occupational History    Not on file   Social Needs    Financial resource strain: Not on file    Food insecurity     Worry: Not on file     Inability: Not on file    Transportation needs     Medical: Not on file     Non-medical: Not on file   Tobacco Use    Smoking status: Former Smoker     Years: 4.00     Last attempt to quit: 2019     Years since quittin.7    Smokeless tobacco: Never Used    Tobacco comment: social smoker, once a month   Substance and Sexual Activity    Alcohol use: Yes     Comment: once a month    Drug use: Yes     Types: Marijuana    Sexual activity: Yes   Lifestyle    Physical activity     Days per week: Not on file     Minutes per session: Not on file    Stress: Not on file   Relationships    Social connections     Talks on phone: Not on file     Gets together: Not on file     Attends Anabaptism service: Not on file     Active member of club or organization: Not on file     Attends meetings of clubs or organizations: Not on file     Relationship status: Not on file    Intimate partner violence     Fear of current or ex partner: Not on file     Emotionally abused: Not on file     Physically abused: Not on file     Forced sexual activity: Not on file   Other Topics Concern    Not on file   Social History Narrative    Not on file     Family History   Problem Relation Age of Onset    Diabetes type 2  Mother     Bipolar Disorder Mother     Hypertension Mother     Alcohol Abuse Father     Depression Father     Asthma Sister     Depression Brother     High Cholesterol Brother      Current Outpatient Medications   Medication Sig Dispense Refill    FLUoxetine (PROZAC) 10 MG capsule Take 1 capsule by mouth daily 30 capsule 2    levothyroxine (SYNTHROID) 112 MCG tablet TAKE ONE TABLET BY MOUTH DAILY 30 tablet 2    CONTOUR NEXT TEST strip 1 each by In Vitro route 8 times daily As needed. 240 each 5    vitamin D (ERGOCALCIFEROL) 1.25 MG (59709 UT) CAPS capsule TAKE ONE CAPSULE BY MOUTH every 2 weeks 4 capsule 2    insulin aspart (NOVOLOG) 100 UNIT/ML injection vial 150 units daily, use in insulin pump 5 vial 3    lamoTRIgine (LAMICTAL) 100 MG tablet Take 2 tablets by mouth daily 60 tablet 5    FREESTYLE LITE strip 8 times daily 250 strip 5    Insulin Syringe-Needle U-100 (INSULIN SYRINGE 1CC/31GX5/16\") 31G X 5/16\" 1 ML MISC 1 each by Does not apply route 3 times daily 100 Syringe 6    glucagon 1 MG injection Inject 1 mg into the muscle See Admin Instructions Follow package directions for low blood sugar.  1 kit 3    acetone, urine, test (KETOSTIX) strip Check urine for ketones if blood glucose is > 250 30 strip 3     No current facility-administered medications for this visit.       No Known Allergies  Family Status   Relation Name Status    Mother  Alive    Father  Alive    Sister  Alive    Brother  Alive       Lab Review:    Lab Results   Component Value Date    WBC 5.7 06/04/2013    HGB 12.2 06/04/2013    HCT 36.9 06/04/2013    MCV 86.5 06/04/2013    PLT COM 06/04/2013     Lab Results   Component Value Date     05/07/2020    K 3.9 05/07/2020     05/07/2020    CO2 27 05/07/2020    BUN 6 05/07/2020    CREATININE 0.57 05/07/2020    GLUCOSE 90 05/07/2020    CALCIUM 8.9 05/07/2020    PROT 6.2 05/07/2020    LABALBU 3.8 05/07/2020    BILITOT 0.4 05/07/2020    ALKPHOS 67 05/07/2020    AST 11 05/07/2020    ALT 9 05/07/2020    LABGLOM >60 02/10/2020    GFRAA 144 05/07/2020    GFRAA 177 06/04/2013    AGRATIO 1.6 05/07/2020    GLOB 2.4 05/07/2020     Lab Results   Component Value Date    TSH 1.53 07/28/2020    TSH 0.68 03/16/2020    FT3 2.1 07/28/2020     Lab Results   Component Value Date    LABA1C 6.4 05/07/2020     Lab Results   Component Value Date    .3 02/10/2020     Lab Results   Component Value Date    CHOL 194 02/10/2020     Lab Results   Component Value Date    TRIG 83 02/10/2020     Lab Results   Component Value Date    HDL 47 02/10/2020     Lab Results   Component Value Date    LDLCALC 130 02/10/2020     Lab Results   Component Value Date    LABVLDL 17 02/10/2020     No results found for: [facility]  Lab Results   Component Value Date    LABMICR 3.00 02/10/2020     Lab Results   Component Value Date    VITD25 67 05/07/2020        Review of Systems:  Constitutional: has fatigue, no fever, no recent weight gain, no recent weight loss, no changes in appetite  Eyes: no eye pain, no change in vision, no eye redness, no eye irritation, no double vision  Ears, nose, throat: has nasal congestion, no sore abnormalities, normocephalic, atraumatic  Eyes: no lid or conjunctival swelling, erythema or discharge, sclera appears normal  Ears/Nose: external inspection of ears and nose revealed no abnormalities, hearing is grossly normal  Oropharynx/Mouth/Face: lips are normal with no lesions, the voice quality was normal  Neck: neck is symmetric, no visualized mass  Pulmonary/chest: respiratory effort normal, no generalized signs of difficulty breathing or signs of respiratory distress  Musculoskeletal: normal station, normal range of motion of neck  Neurological: no facial asymmetry, normal general cortical function      ASSESSMENT/PLAN:  1. Type 1 diabetes, uncontrolled, with neuropathy ([address]  Sees maternal fetal medicine  High risk  HbA1C 10.1-7.0-6.9-6.4  Continue Medtronic pump and Guardian  Not using auto mode. Continue to follow with maternal-fetal medicine  Counseled on BG goals for pregnancy  - Novolog  - Comprehensive Metabolic Panel; Future  GADA positive  C-peptide<0.1    2. Hashimoto's thyroiditis  TSH 2.4-2.1-3.55-1.5  Thyroid sonogram  TPOab, Tg ab.    3. Vitamin D deficiency  25 hydroxy vitamin D 16.3-36.5-67  Viitamin D 50,000 IU every other week  Obtain 25OH vitamin D    4. Obesity  Diet, exercise. 5. Multinodular Goiter  TSH 1.5-0.68-0.67-1.53  Right 5 mm and left 3 mm colloid nodules/cysts  Thyroid sono    6. Hyperlipidemia  LDL 60-  Stopped Rosuvastatin, pregnancy    7. Hypothyroidism  Has palpitations, worse  See OB-GYN  Unlikely related to thyroid  TSH 2.4-2.1-3.55-1.5-0.67-1.53  Levothyroxine 0.112 mg qd    8.   Pregnancy  29 weeks pregnant  2nd pregnancy  Her girl is 9yo      Reviewed and/or ordered clinical lab results Yes  Reviewed and/or ordered radiology tests Yes  Reviewed and/or ordered other diagnostic tests No  Discussed test results with performing physician No  Independently reviewed image, tracing, or specimen   Made a decision to obtain old records No  Reviewed and summarized old records Yes  Hemoglobin A1c 10.1  LDL 60  TSH 1.68  Obtained history from other than patient No    Marie Lindo was counseled regarding symptoms of diabetes diagnosis, course and complications of disease if inadequately treated, side effects of medications, diagnosis, treatment options, and prognosis, risks, benefits, complications, and alternatives of treatment, labs, imaging and other studies and treatment targets and goals, pregnancy related concerns, target and goals, hypoglycemia management. Marie Lindo understands instructions and counseling. These diagnosis were discussed and reviewed with the patient including the advantages of drug therapy. Marie Lindo was counseled at this visit on the following: diabetes complication prevention and foot care. Karrie Jewell is a 27 y.o. female being evaluated by a Virtual Visit (video visit) encounter, including two-way audio and video communication, in lieu of an in-person visit due to coronavirus emergency, to address concerns as mentioned in history and assessment and plan. Patient identification was verified at the start of the visit. I conducted an interview, performed a limited exam by video and educated the patient on my assessment and plan. Due to this being a TeleHealth encounter (During [facility] public health emergency), evaluation of the following organ systems was limited: Vitals/Constitutional/EENT/Resp/CV/GI//MS/Neuro/Skin/Heme-Lymph-Imm. Pursuant to the emergency declaration under the [facility], [address] [place] [address] authority and the [employer] General Act, this Virtual Visit was conducted with patient's (and/or legal guardian's) consent, to reduce the patient's risk of exposure to COVID-19 and provide necessary medical care.       The patient (and/or legal guardian) has also been advised to contact this office for worsening conditions or problems, and seek emergency

## 2020-07-31 NOTE — TELEPHONE ENCOUNTER
Patient said that you she talked with you regarding download of her CGM data. Please download it for my review.   Thank you

## 2020-09-02 ENCOUNTER — TELEPHONE (OUTPATIENT)
Dept: ENDOCRINOLOGY | Age: 30
End: 2020-09-02

## 2020-09-03 NOTE — TELEPHONE ENCOUNTER
I am not sure how to expedite after it has already been sent. However, Hellen usually responds in 24 hrs.

## 2020-09-03 NOTE — TELEPHONE ENCOUNTER
PA for contour test strips approved. :Approved; Review Type:Prior Auth; Coverage Start Date:09/03/2020; Coverage End Date:09/03/2021;

## 2020-09-29 RX ORDER — LEVOTHYROXINE SODIUM 112 UG/1
TABLET ORAL
Qty: 30 TABLET | Refills: 0 | Status: SHIPPED | OUTPATIENT
Start: 2020-09-29 | End: 2020-10-07

## 2020-10-07 RX ORDER — LEVOTHYROXINE SODIUM 112 UG/1
TABLET ORAL
Qty: 30 TABLET | Refills: 0 | Status: SHIPPED | OUTPATIENT
Start: 2020-10-07 | End: 2020-11-09

## 2020-10-09 RX ORDER — LAMOTRIGINE 100 MG/1
TABLET ORAL
Qty: 60 TABLET | Refills: 4 | Status: SHIPPED | OUTPATIENT
Start: 2020-10-09 | End: 2020-11-10 | Stop reason: SDUPTHER

## 2020-10-09 RX ORDER — FLUOXETINE 10 MG/1
CAPSULE ORAL
Qty: 30 CAPSULE | Refills: 1 | Status: SHIPPED | OUTPATIENT
Start: 2020-10-09 | End: 2021-03-23

## 2020-10-19 RX ORDER — ERGOCALCIFEROL 1.25 MG/1
CAPSULE ORAL
Qty: 4 CAPSULE | Refills: 1 | Status: SHIPPED | OUTPATIENT
Start: 2020-10-19 | End: 2020-12-20

## 2020-10-27 ENCOUNTER — OFFICE VISIT (OUTPATIENT)
Dept: ENDOCRINOLOGY | Age: 30
End: 2020-10-27
Payer: COMMERCIAL

## 2020-10-27 VITALS
SYSTOLIC BLOOD PRESSURE: 128 MMHG | WEIGHT: 200 LBS | TEMPERATURE: 97.5 F | OXYGEN SATURATION: 97 % | DIASTOLIC BLOOD PRESSURE: 80 MMHG | HEART RATE: 81 BPM | RESPIRATION RATE: 14 BRPM | BODY MASS INDEX: 34.15 KG/M2 | HEIGHT: 64 IN

## 2020-10-27 PROCEDURE — G8417 CALC BMI ABV UP PARAM F/U: HCPCS | Performed by: INTERNAL MEDICINE

## 2020-10-27 PROCEDURE — 95251 CONT GLUC MNTR ANALYSIS I&R: CPT | Performed by: INTERNAL MEDICINE

## 2020-10-27 PROCEDURE — 1036F TOBACCO NON-USER: CPT | Performed by: INTERNAL MEDICINE

## 2020-10-27 PROCEDURE — 2022F DILAT RTA XM EVC RTNOPTHY: CPT | Performed by: INTERNAL MEDICINE

## 2020-10-27 PROCEDURE — G8427 DOCREV CUR MEDS BY ELIG CLIN: HCPCS | Performed by: INTERNAL MEDICINE

## 2020-10-27 PROCEDURE — 3044F HG A1C LEVEL LT 7.0%: CPT | Performed by: INTERNAL MEDICINE

## 2020-10-27 PROCEDURE — 99214 OFFICE O/P EST MOD 30 MIN: CPT | Performed by: INTERNAL MEDICINE

## 2020-10-27 PROCEDURE — G8484 FLU IMMUNIZE NO ADMIN: HCPCS | Performed by: INTERNAL MEDICINE

## 2020-10-27 NOTE — PROGRESS NOTES
Joy Pepper is a 27 y.o. female who presents for Type 1 diabetes mellitus. Current symptoms/problems include hyperglycemia and are worsening. 1.  Type 1 diabetes, uncontrolled, with neuropathy (HCC) [E10.40, E10.65]     Diagnosed with Type 1 diabetes mellitus in 2010.     Comorbid conditions: Neuropathy     Current diabetic medications include: Novolog  Medtronic Minimed Guardian CGM  Intolerance to diabetes medications: No     Weight trend: stable  Prior visit with dietician: yes  Current diet: on average, 1 meal per day and other snacks  Current exercise: occasional, active     Current monitoring regimen: home blood tests - 5 times daily  Has brought blood glucose log/meter:  Yes  Home blood sugar records: fasting range: 70-90-95 and postprandial range:   Any episodes of hypoglycemia? Yes  Hypoglycemia frequency and time(s):  4-5 times a week  Does patient have Glucagon emergency kit? Yes  Does patient have rapid acting carbohydrate?  Yes  Does patient wear a medic alert bracelet or necklace?  No     2. Hashimoto's thyroiditis  Has fatigue. Dx in 2016.     3. Vitamin D deficiency  Has fatigue.     4. Obesity  Eats healthier. Occasional exercise.     5. Multinodular Goiter  No difficulty swallowing, hoarseness. .  No family history of thyroid cancer. No radiation to her neck.     6. Hyperlipidemia  No muscle pain     7.  Hypothyroidism  Has fatigue       Past Medical History:   Diagnosis Date    Bipolar 1 disorder ([address]     Diabetes mellitus ([address]     Diabetes mellitus type I ([address]     Diabetic keto-acidosis ([address] 2014    admitted at [address] disease       Patient Active Problem List   Diagnosis    Vitamin D deficiency    Hashimoto's thyroiditis    Bipolar 1 disorder, depressed, moderate (HCC)    Type 1 diabetes, uncontrolled, with neuropathy ([address]    Class 2 obesity with body mass index (BMI) of 36.0 to 36.9 in adult    Multinodular goiter    Anxiety    Acquired hypothyroidism    Pregnancy    Other hyperlipidemia     Past Surgical History:   Procedure Laterality Date     SECTION  10/05/2020    LAPAROSCOPY  2016    WISDOM TOOTH EXTRACTION Bilateral 2007     Social History     Socioeconomic History    Marital status: Single     Spouse name: Not on file    Number of children: Not on file    Years of education: Not on file    Highest education level: Not on file   Occupational History    Not on file   Social Needs    Financial resource strain: Not on file    Food insecurity     Worry: Not on file     Inability: Not on file    Transportation needs     Medical: Not on file     Non-medical: Not on file   Tobacco Use    Smoking status: Former Smoker     Years: 4.00     Last attempt to quit: 2019     Years since quittin.0    Smokeless tobacco: Never Used    Tobacco comment: social smoker, once a month   Substance and Sexual Activity    Alcohol use: Yes     Comment: once a month    Drug use: Yes     Types: Marijuana    Sexual activity: Yes   Lifestyle    Physical activity     Days per week: Not on file     Minutes per session: Not on file    Stress: Not on file   Relationships    Social connections     Talks on phone: Not on file     Gets together: Not on file     Attends Spiritism service: Not on file     Active member of club or organization: Not on file     Attends meetings of clubs or organizations: Not on file     Relationship status: Not on file    Intimate partner violence     Fear of current or ex partner: Not on file     Emotionally abused: Not on file     Physically abused: Not on file     Forced sexual activity: Not on file   Other Topics Concern    Not on file   Social History Narrative    Not on file     Family History   Problem Relation Age of Onset    Diabetes type 2  Mother     Bipolar Disorder Mother     Hypertension Mother     Alcohol Abuse Father     Depression Father     Asthma Sister     Depression 05/07/2020     Lab Results   Component Value Date    TSH 1.53 07/28/2020    TSH 0.68 03/16/2020    FT3 2.1 07/28/2020     Lab Results   Component Value Date    LABA1C 6.4 05/07/2020     Lab Results   Component Value Date    .3 02/10/2020     Lab Results   Component Value Date    CHOL 194 02/10/2020     Lab Results   Component Value Date    TRIG 83 02/10/2020     Lab Results   Component Value Date    HDL 47 02/10/2020     Lab Results   Component Value Date    LDLCALC 130 02/10/2020     Lab Results   Component Value Date    LABVLDL 17 02/10/2020     No results found for: [facility]  Lab Results   Component Value Date    LABMICR 3.00 02/10/2020     Lab Results   Component Value Date    VITD25 67 05/07/2020        Review of Systems:  Constitutional: has fatigue, no fever, no recent weight gain, no recent weight loss, no changes in appetite  Eyes: no eye pain, no change in vision, no eye redness, no eye irritation, no double vision  Ears, nose, throat: has nasal congestion, no sore throat, no earache, no decrease in hearing, no hoarseness, no dry mouth, has sinus problems, no difficulty swallowing, no neck lumps, no dental problems, no mouth sores, no ringing in ears  Pulmonary: no shortness of breath, no wheezing, no dyspnea on exertion, no cough  Cardiovascular: no chest pain, has lower extremity edema, no orthopnea, no intermittent leg claudication, has palpitations  Gastrointestinal: no abdominal pain, has nausea, no vomiting, has diarrhea, has constipation, no dysphagia, no heartburn, no bloating  Genitourinary: no dysuria, no urinary incontinence, no urinary hesitancy, has urinary frequency, no feelings of urinary urgency, no nocturia  Musculoskeletal: no joint swelling, no joint stiffness, has joint pain, no muscle cramps, no muscle pain, no bone pain  Integument/Breast: has hair loss, no skin rashes, no skin lesions, no itching, has dry skin  Neurological: no numbness, no tingling, no weakness, no confusion, has headaches, no dizziness, no fainting, no tremors, has decrease in memory, no balance problems  Psychiatric: has anxiety, has depression, has insomnia  Hematologic/Lymphatic: no tendency for easy bleeding, no swollen lymph nodes, no tendency for easy bruising  Immunology: has seasonal allergies, no frequent infections, no frequent illnesses  Endocrine: no temperature intolerance    /80   Pulse 81   Temp 97.5 °F (36.4 °C)   Resp 14   Ht 5' 4\" (1.626 m)   Wt 200 lb (90.7 kg)   LMP 01/05/2020   SpO2 97%   BMI 34.33 kg/m²    Wt Readings from Last 3 Encounters:   10/27/20 200 lb (90.7 kg)   03/16/20 211 lb 12.8 oz (96.1 kg)   02/11/20 215 lb 9.6 oz (97.8 kg)     Body mass index is 34.33 kg/m².       OBJECTIVE:  Constitutional: no acute distress, well appearing and well nourished  Psychiatric: oriented to person, place and time, judgement and insight and normal, recent and remote memory and intact and mood and affect are normal  Skin: skin and subcutaneous tissue is normal without mass, normal turgor  Head and Face: examination of head and face revealed no abnormalities  Eyes: no lid or conjunctival swelling, erythema or discharge, pupils are normal, equal, round, reactive to light  Ears/Nose: external inspection of ears and nose revealed no abnormalities, hearing is grossly normal  Oropharynx/Mouth/Face: lips, tongue and gums are normal with no lesions, the voice quality was normal  Neck: neck is supple and symmetric, with midline trachea and no masses, thyroid is enlarged  Lymphatics: normal cervical lymph nodes, normal supraclavicular nodes  Pulmonary: no increased work of breathing or signs of respiratory distress, lungs are clear to auscultation  Cardiovascular: normal heart rate and rhythm, normal S1 and S2, no murmurs and pedal pulses and 2+ bilaterally, No edema  Abdomen: abdomen is soft, non-tender with no masses  Musculoskeletal: normal gait and station and exam of the digits and nails are normal  Neurological: normal coordination and normal general cortical function      ASSESSMENT/PLAN:  1. Type 1 diabetes, uncontrolled, with neuropathy ([facility]    Call for results  HbA1C 10.1-7.0-6.9-6.4  Continue Medtronic pump and Guardian CGM  Not using auto mode, resume auto mode  Continue to follow with maternal-fetal medicine  - Novolog  - Comprehensive Metabolic Panel; Future  GADA positive  C-peptide<0.1     2. Hashimoto's thyroiditis  TSH 2.4-2.1-3.55-1.5  Thyroid sonogram  TPOab, Tg ab.     3. Vitamin D deficiency  25 hydroxy vitamin D 16.3-36.5-67  Viitamin D 50,000 IU every other week  Obtain 25OH vitamin D     4. Obesity  Diet, exercise.     5. Multinodular Goiter  TSH 1.5-0.68-0.67-1.53  Right 5 mm and left 3 mm colloid nodules/cysts  Thyroid sono     6. Hyperlipidemia  LDL 60-  Stopped Rosuvastatin during pregnancy     7. Hypothyroidism    TSH 2.4-2.1-3.55-1.5-0.67-1.53  Levothyroxine 0.112 mg qd     Reviewed and/or ordered clinical lab results Yes  Reviewed and/or ordered radiology tests Yes  Reviewed and/or ordered other diagnostic tests No  Discussed test results with performing physician No  Independently reviewed image, tracing, or specimen  Made a decision to obtain old records No  Reviewed and summarized old records Yes  Hemoglobin A1c 10.1  LDL 60  TSH 1.68  Obtained history from other than patient No    Chanel Chanelaamir was counseled regarding symptoms of diabetes diagnosis, course and complications of disease if inadequately treated, side effects of medications, diagnosis, treatment options, and prognosis, risks, benefits, complications, and alternatives of treatment, labs, imaging and other studies and treatment targets and goals, sensors, insulin pump, Medtronic, targets, goals, importance of diabetes control, thyroid adjustments  She understands instructions and counseling.     CGMS Download Review and Recommendations  See scanned report tracing  This was separate service provided for CGM S interpretation  Guardian personal CGMS data downloaded and reviewed. Average glucose 137± 74 SD  Time in range: 62%  Time above 180: 23%  Time under 70: 15%     Basal pattern review: Hypoglycemia most of the day  Postprandial pattern review: Hyperglycemia postprandially  Hypoglycemia review: Hypoglycemia between 11 AM and 8 PM, 1:26 AM and 9:45 AM, 10 PM to 12:40 AM   Activity related review: Not recorded      Based on the data, I recommend:    1. Decrease of basal rates as follows: Midnight-0.95 units/h, 3 AM 1.3 units/h, 8 AM 1.15 units/h, 12 PM 1.15 units/h, 4 PM 1.2 units/h, 7 PM 1.15 units/h, 10 PM 0.95 units/h    2. Change sensitivity to 40 from 6 AM to 10 PM    3. Resume auto mode    4. Hypoglycemia management    5. Continue further adjustments as indicated        Return in about 6 weeks (around 12/8/2020) for diabetes, thyroid problems.

## 2020-11-09 RX ORDER — LEVOTHYROXINE SODIUM 112 UG/1
TABLET ORAL
Qty: 30 TABLET | Refills: 0 | Status: SHIPPED | OUTPATIENT
Start: 2020-11-09 | End: 2020-12-31

## 2020-11-10 RX ORDER — LAMOTRIGINE 100 MG/1
TABLET ORAL
Qty: 60 TABLET | Refills: 4 | Status: SHIPPED | OUTPATIENT
Start: 2020-11-10 | End: 2021-06-24 | Stop reason: SDUPTHER

## 2020-12-10 ENCOUNTER — OFFICE VISIT (OUTPATIENT)
Dept: ENDOCRINOLOGY | Age: 30
End: 2020-12-10
Payer: COMMERCIAL

## 2020-12-10 VITALS
TEMPERATURE: 97 F | DIASTOLIC BLOOD PRESSURE: 80 MMHG | SYSTOLIC BLOOD PRESSURE: 100 MMHG | RESPIRATION RATE: 14 BRPM | HEART RATE: 66 BPM | WEIGHT: 207 LBS | BODY MASS INDEX: 35.34 KG/M2 | HEIGHT: 64 IN

## 2020-12-10 DIAGNOSIS — E03.9 ACQUIRED HYPOTHYROIDISM: ICD-10-CM

## 2020-12-10 DIAGNOSIS — E78.49 OTHER HYPERLIPIDEMIA: ICD-10-CM

## 2020-12-10 DIAGNOSIS — E55.9 VITAMIN D DEFICIENCY: ICD-10-CM

## 2020-12-10 LAB
A/G RATIO: 1.9 (ref 1.1–2.2)
ALBUMIN SERPL-MCNC: 4.4 G/DL (ref 3.4–5)
ALP BLD-CCNC: 116 U/L (ref 40–129)
ALT SERPL-CCNC: 21 U/L (ref 10–40)
ANION GAP SERPL CALCULATED.3IONS-SCNC: 10 MMOL/L (ref 3–16)
AST SERPL-CCNC: 23 U/L (ref 15–37)
BILIRUB SERPL-MCNC: 0.4 MG/DL (ref 0–1)
BUN BLDV-MCNC: 13 MG/DL (ref 7–20)
CALCIUM SERPL-MCNC: 9.2 MG/DL (ref 8.3–10.6)
CHLORIDE BLD-SCNC: 102 MMOL/L (ref 99–110)
CHOLESTEROL, TOTAL: 247 MG/DL (ref 0–199)
CO2: 28 MMOL/L (ref 21–32)
CREAT SERPL-MCNC: 0.6 MG/DL (ref 0.6–1.1)
CREATININE URINE: 220.9 MG/DL (ref 28–259)
ESTIMATED AVERAGE GLUCOSE: 162.8 MG/DL
GFR AFRICAN AMERICAN: >60
GFR NON-AFRICAN AMERICAN: >60
GLOBULIN: 2.3 G/DL
GLUCOSE BLD-MCNC: 173 MG/DL (ref 70–99)
HBA1C MFR BLD: 7.3 %
HDLC SERPL-MCNC: 60 MG/DL (ref 40–60)
LDL CHOLESTEROL CALCULATED: 158 MG/DL
MICROALBUMIN UR-MCNC: 3 MG/DL
MICROALBUMIN/CREAT UR-RTO: 13.6 MG/G (ref 0–30)
POTASSIUM SERPL-SCNC: 4.5 MMOL/L (ref 3.5–5.1)
SODIUM BLD-SCNC: 140 MMOL/L (ref 136–145)
T3 FREE: 2.7 PG/ML (ref 2.3–4.2)
T4 FREE: 1.1 NG/DL (ref 0.9–1.8)
TOTAL PROTEIN: 6.7 G/DL (ref 6.4–8.2)
TRIGL SERPL-MCNC: 146 MG/DL (ref 0–150)
TSH SERPL DL<=0.05 MIU/L-ACNC: 0.93 UIU/ML (ref 0.27–4.2)
VITAMIN D 25-HYDROXY: 66.2 NG/ML
VLDLC SERPL CALC-MCNC: 29 MG/DL

## 2020-12-10 PROCEDURE — G8417 CALC BMI ABV UP PARAM F/U: HCPCS | Performed by: INTERNAL MEDICINE

## 2020-12-10 PROCEDURE — 2022F DILAT RTA XM EVC RTNOPTHY: CPT | Performed by: INTERNAL MEDICINE

## 2020-12-10 PROCEDURE — 1036F TOBACCO NON-USER: CPT | Performed by: INTERNAL MEDICINE

## 2020-12-10 PROCEDURE — G8484 FLU IMMUNIZE NO ADMIN: HCPCS | Performed by: INTERNAL MEDICINE

## 2020-12-10 PROCEDURE — 95251 CONT GLUC MNTR ANALYSIS I&R: CPT | Performed by: INTERNAL MEDICINE

## 2020-12-10 PROCEDURE — G8427 DOCREV CUR MEDS BY ELIG CLIN: HCPCS | Performed by: INTERNAL MEDICINE

## 2020-12-10 PROCEDURE — 99214 OFFICE O/P EST MOD 30 MIN: CPT | Performed by: INTERNAL MEDICINE

## 2020-12-10 PROCEDURE — 3051F HG A1C>EQUAL 7.0%<8.0%: CPT | Performed by: INTERNAL MEDICINE

## 2020-12-10 RX ORDER — LEVOTHYROXINE SODIUM 112 UG/1
TABLET ORAL
Qty: 30 TABLET | Refills: 0 | Status: CANCELLED | OUTPATIENT
Start: 2020-12-10

## 2020-12-10 NOTE — PROGRESS NOTES
Megan Saeed is a 27 y.o. female who presents for Type 1 diabetes mellitus. Current symptoms/problems include hyperglycemia and are worsening. 1.  Type 1 diabetes, uncontrolled, with neuropathy (HCC) [E10.40, E10.65]     Diagnosed with Type 1 diabetes mellitus in 2010.     Comorbid conditions: Neuropathy     Current diabetic medications include: Novolog  Medtronic Minimed Guardian CGM  Intolerance to diabetes medications: No     Weight trend: stable  Prior visit with dietician: yes  Current diet: on average, 1 meal per day and other snacks  Current exercise: occasional, active     Current monitoring regimen: home blood tests - 5 times daily  Has brought blood glucose log/meter:  Yes  Home blood sugar records: fasting range: 150-250 and postprandial range: 150-250  Any episodes of hypoglycemia? Yes  Hypoglycemia frequency and time(s):  4-5 times a week  Does patient have Glucagon emergency kit? Yes  Does patient have rapid acting carbohydrate?  Yes  Does patient wear a medic alert bracelet or necklace?  No     2. Hashimoto's thyroiditis  Has fatigue. Dx in 2016.     3. Vitamin D deficiency  Has fatigue.     4. Obesity  Eats healthier. Occasional exercise.     5. Multinodular Goiter  No difficulty swallowing, hoarseness. .  No family history of thyroid cancer. No radiation to her neck.     6. Hyperlipidemia  No muscle pain     7.  Hypothyroidism  Has fatigue       Past Medical History:   Diagnosis Date    Bipolar 1 disorder ([address]     Diabetes mellitus ([address]     Diabetes mellitus type I ([address]     Diabetic keto-acidosis ([address] 2014    admitted at [address] disease       Patient Active Problem List   Diagnosis    Vitamin D deficiency    Hashimoto's thyroiditis    Bipolar 1 disorder, depressed, moderate (HCC)    Type 1 diabetes, uncontrolled, with neuropathy ([address]    Class 1 obesity with body mass index (BMI) of 34.0 to 34.9 in adult    Multinodular goiter    Anxiety    Acquired hypothyroidism    Pregnancy    Other hyperlipidemia     Past Surgical History:   Procedure Laterality Date     SECTION  10/05/2020    LAPAROSCOPY  2016    WISDOM TOOTH EXTRACTION Bilateral 2007     Social History     Socioeconomic History    Marital status: Single     Spouse name: Not on file    Number of children: Not on file    Years of education: Not on file    Highest education level: Not on file   Occupational History    Not on file   Social Needs    Financial resource strain: Not on file    Food insecurity     Worry: Not on file     Inability: Not on file    Transportation needs     Medical: Not on file     Non-medical: Not on file   Tobacco Use    Smoking status: Former Smoker     Years: 4.00     Last attempt to quit: 2019     Years since quittin.1    Smokeless tobacco: Never Used    Tobacco comment: social smoker, once a month   Substance and Sexual Activity    Alcohol use: Yes     Comment: once a month    Drug use: Yes     Types: Marijuana    Sexual activity: Yes   Lifestyle    Physical activity     Days per week: Not on file     Minutes per session: Not on file    Stress: Not on file   Relationships    Social connections     Talks on phone: Not on file     Gets together: Not on file     Attends [employer] service: Not on file     Active member of club or organization: Not on file     Attends meetings of clubs or organizations: Not on file     Relationship status: Not on file    Intimate partner violence     Fear of current or ex partner: Not on file     Emotionally abused: Not on file     Physically abused: Not on file     Forced sexual activity: Not on file   Other Topics Concern    Not on file   Social History Narrative    Not on file     Family History   Problem Relation Age of Onset    Diabetes type 2  Mother     Bipolar Disorder Mother     Hypertension Mother     Alcohol Abuse Father     Depression Father     Asthma Sister     Depression Brother     High Cholesterol Brother      Current Outpatient Medications   Medication Sig Dispense Refill    lamoTRIgine (LAMICTAL) 100 MG tablet TAKE TWO TABLETS BY MOUTH DAILY 60 tablet 4    levothyroxine (SYNTHROID) 112 MCG tablet TAKE ONE TABLET BY MOUTH DAILY 30 tablet 0    vitamin D (ERGOCALCIFEROL) 1.25 MG (86094 UT) CAPS capsule TAKE ONE CAPSULE BY MOUTH ONCE WEEKLY 4 capsule 1    FLUoxetine (PROZAC) 10 MG capsule TAKE ONE CAPSULE BY MOUTH DAILY 30 capsule 1    CONTOUR NEXT TEST strip 1 each by In Vitro route 8 times daily As needed. 240 each 5    insulin aspart (NOVOLOG) 100 UNIT/ML injection vial 150 units daily, use in insulin pump 5 vial 3    Insulin Syringe-Needle U-100 (INSULIN SYRINGE 1CC/31GX5/16\") 31G X 5/16\" 1 ML MISC 1 each by Does not apply route 3 times daily 100 Syringe 6    glucagon 1 MG injection Inject 1 mg into the muscle See Admin Instructions Follow package directions for low blood sugar. 1 kit 3    acetone, urine, test (KETOSTIX) strip Check urine for ketones if blood glucose is > 250 30 strip 3     No current facility-administered medications for this visit.       No Known Allergies  Family Status   Relation Name Status    Mother  Alive    Father  Alive    Sister  Alive    Brother  Alive       Lab Review:    Lab Results   Component Value Date    WBC 5.7 06/04/2013    HGB 12.2 06/04/2013    HCT 36.9 06/04/2013    MCV 86.5 06/04/2013    PLT COM 06/04/2013     Lab Results   Component Value Date     05/07/2020    K 3.9 05/07/2020     05/07/2020    CO2 27 05/07/2020    BUN 6 05/07/2020    CREATININE 0.57 05/07/2020    GLUCOSE 90 05/07/2020    CALCIUM 8.9 05/07/2020    PROT 6.2 05/07/2020    LABALBU 3.8 05/07/2020    BILITOT 0.4 05/07/2020    ALKPHOS 67 05/07/2020    AST 11 05/07/2020    ALT 9 05/07/2020    LABGLOM >60 02/10/2020    GFRAA 144 05/07/2020    GFRAA 177 06/04/2013    AGRATIO 1.6 05/07/2020    GLOB 2.4 05/07/2020     Lab Results   Component Value Date TSH 1.53 07/28/2020    TSH 0.68 03/16/2020    FT3 2.1 07/28/2020     Lab Results   Component Value Date    LABA1C 6.4 05/07/2020     Lab Results   Component Value Date    .3 02/10/2020     Lab Results   Component Value Date    CHOL 194 02/10/2020     Lab Results   Component Value Date    TRIG 83 02/10/2020     Lab Results   Component Value Date    HDL 47 02/10/2020     Lab Results   Component Value Date    LDLCALC 130 02/10/2020     Lab Results   Component Value Date    LABVLDL 17 02/10/2020     No results found for: [facility]  Lab Results   Component Value Date    LABMICR 3.00 02/10/2020     Lab Results   Component Value Date    VITD25 67 05/07/2020        Review of Systems:  Constitutional: has fatigue, no fever, no recent weight gain, no recent weight loss, no changes in appetite  Eyes: no eye pain, no change in vision, no eye redness, no eye irritation, no double vision  Ears, nose, throat: has nasal congestion, no sore throat, no earache, no decrease in hearing, no hoarseness, no dry mouth, has sinus problems, no difficulty swallowing, no neck lumps, no dental problems, no mouth sores, no ringing in ears  Pulmonary: no shortness of breath, no wheezing, no dyspnea on exertion, no cough  Cardiovascular: no chest pain, has lower extremity edema, no orthopnea, no intermittent leg claudication, has palpitations  Gastrointestinal: no abdominal pain, has nausea, no vomiting, has diarrhea, has constipation, no dysphagia, no heartburn, no bloating  Genitourinary: no dysuria, no urinary incontinence, no urinary hesitancy, has urinary frequency, no feelings of urinary urgency, no nocturia  Musculoskeletal: no joint swelling, no joint stiffness, has joint pain, no muscle cramps, no muscle pain, no bone pain  Integument/Breast: has hair loss, no skin rashes, no skin lesions, no itching, has dry skin  Neurological: no numbness, no tingling, no weakness, no confusion, has headaches, no dizziness, no fainting, no tremors, has decrease in memory, no balance problems  Psychiatric: has anxiety, has depression, has insomnia  Hematologic/Lymphatic: no tendency for easy bleeding, no swollen lymph nodes, no tendency for easy bruising  Immunology: has seasonal allergies, no frequent infections, no frequent illnesses  Endocrine: no temperature intolerance    /80   Pulse 66   Temp 97 °F (36.1 °C)   Resp 14   Ht 5' 4\" (1.626 m)   Wt 207 lb (93.9 kg)   LMP 01/05/2020   BMI 35.53 kg/m²    Wt Readings from Last 3 Encounters:   12/10/20 207 lb (93.9 kg)   10/27/20 200 lb (90.7 kg)   03/16/20 211 lb 12.8 oz (96.1 kg)     Body mass index is 35.53 kg/m².       OBJECTIVE:  Constitutional: no acute distress, well appearing and well nourished  Psychiatric: oriented to person, place and time, judgement and insight and normal, recent and remote memory and intact and mood and affect are normal  Skin: skin and subcutaneous tissue is normal without mass, normal turgor  Head and Face: examination of head and face revealed no abnormalities  Eyes: no lid or conjunctival swelling, erythema or discharge, pupils are normal, equal, round, reactive to light  Ears/Nose: external inspection of ears and nose revealed no abnormalities, hearing is grossly normal  Oropharynx/Mouth/Face: lips, tongue and gums are normal with no lesions, the voice quality was normal  Neck: neck is supple and symmetric, with midline trachea and no masses, thyroid is enlarged  Lymphatics: normal cervical lymph nodes, normal supraclavicular nodes  Pulmonary: no increased work of breathing or signs of respiratory distress, lungs are clear to auscultation  Cardiovascular: normal heart rate and rhythm, normal S1 and S2, no murmurs and pedal pulses and 2+ bilaterally, No edema  Abdomen: abdomen is soft, non-tender with no masses  Musculoskeletal: normal gait and station and exam of the digits and nails are normal  Neurological: normal coordination and normal general cortical function      ASSESSMENT/PLAN:  1. Type 1 diabetes, uncontrolled, with neuropathy ([facility]  Call for results  HbA1C 10.1-7.0-6.9-6.4  Continue Medtronic pump and Guardian CGM  Resumed auto mode  - Novolog  - Comprehensive Metabolic Panel; Future  GADA positive  C-peptide<0.1     2. Hashimoto's thyroiditis  TSH 2.4-2.1-3.55-1.5  Thyroid sonogram  TPOab, Tg ab.     3. Vitamin D deficiency  25 hydroxy vitamin D 16.3-36.5-67  Viitamin D 50,000 IU every other week  Obtain 25OH vitamin D     4. Obesity  Diet, exercise.     5. Multinodular Goiter  TSH 1.5-0.68-0.67-1.53  Right 5 mm and left 3 mm colloid nodules/cysts  Thyroid sono     6. Hyperlipidemia  LDL 60-  Stopped Rosuvastatin during pregnancy     7. Hypothyroidism  TSH 2.4-2.1-3.55-1.5-0.67-1.53  Levothyroxine 0.112 mg qd     Reviewed and/or ordered clinical lab results Yes  Reviewed and/or ordered radiology tests Yes  Reviewed and/or ordered other diagnostic tests No  Discussed test results with performing physician No  Independently reviewed image, tracing, or specimen  Made a decision to obtain old records No  Reviewed and summarized old records Yes  Hemoglobin A1c 10.1  LDL 60  TSH 1.68  Obtained history from other than patient No    Danielle Pima was counseled regarding symptoms of diabetes diagnosis, course and complications of disease if inadequately treated, side effects of medications, diagnosis, treatment options, and prognosis, risks, benefits, complications, and alternatives of treatment, labs, imaging and other studies and treatment targets and goals,  targets, goals, importance of diabetes control, thyroid adjustments  She understands instructions and counseling.     CGMS Download Review and Recommendations  See scanned report tracing  This was separate service provided for CGMS interpretation  Guardian personal CGMS data downloaded and reviewed  Average glucose 164± 69 SD  Time in range: 63%  Time above 180: 32%  Time under 70: 5%     Basal pattern review: Hyperglycemia, worse in the afternoon and at night  Postprandial pattern review: Hyperglycemia postprandially  Hypoglycemia review: Mostly 3 AM to 5:30 AM  Activity related review: Not recorded      Based on the data, I recommend:    1. Basal rates as follows: Midnight-1.00 units/h, 3 AM 1.4 units/h, 8 AM 1.2 units/h, 12 PM 1.2 units/h, 4 PM 1.25 units/h, 7 PM 1.25 units/h, 10 PM 1.00 units/h    2. Change sensitivity to 30 from 6 AM to 10 PM    3. Change insulin to carb ratio to 4    4. Hypoglycemia management    5. Try to stay in auto mode as much as possible    Return in about 3 months (around 3/10/2021) for diabetes.

## 2020-12-20 RX ORDER — ERGOCALCIFEROL 1.25 MG/1
CAPSULE ORAL
Qty: 4 CAPSULE | Refills: 3 | Status: SHIPPED | OUTPATIENT
Start: 2020-12-20 | End: 2021-05-24

## 2020-12-29 ENCOUNTER — OFFICE VISIT (OUTPATIENT)
Dept: FAMILY MEDICINE CLINIC | Age: 30
End: 2020-12-29
Payer: COMMERCIAL

## 2020-12-29 VITALS
DIASTOLIC BLOOD PRESSURE: 68 MMHG | BODY MASS INDEX: 36.7 KG/M2 | TEMPERATURE: 97.8 F | SYSTOLIC BLOOD PRESSURE: 118 MMHG | HEART RATE: 80 BPM | OXYGEN SATURATION: 99 % | WEIGHT: 215 LBS | HEIGHT: 64 IN

## 2020-12-29 PROCEDURE — 99395 PREV VISIT EST AGE 18-39: CPT | Performed by: FAMILY MEDICINE

## 2020-12-29 PROCEDURE — G8482 FLU IMMUNIZE ORDER/ADMIN: HCPCS | Performed by: FAMILY MEDICINE

## 2020-12-29 RX ORDER — ROSUVASTATIN CALCIUM 20 MG/1
20 TABLET, COATED ORAL DAILY
Qty: 30 TABLET | Refills: 5 | Status: SHIPPED | OUTPATIENT
Start: 2020-12-29 | End: 2021-08-30

## 2020-12-29 NOTE — PROGRESS NOTES
Subjective:      Patient ID: Danielle Arverne is a 27 y.o. female. ELVIRA   Pt is a of 27 y.o. female comes in today with   Chief Complaint   Patient presents with    Annual Exam     Patient is here for her yearly physical, is fasting. Was off crestor during pregnancy. Recent ldl was 158. Following with endo for dm  Mood has been stable on current meds. Past Medical History:Reviewed  Medications:Reviewed. No Known Allergies   Social hx:Reviewed. Social History     Tobacco Use   Smoking Status Former Smoker    Years: 4.00    Quit date: 2019    Years since quittin.1   Smokeless Tobacco Never Used   Tobacco Comment    social smoker, once a month       Vitals:    20 0815   BP: 118/68   Site: Left Upper Arm   Position: Sitting   Cuff Size: Medium Adult   Pulse: 80   Temp: 97.8 °F (36.6 °C)   TempSrc: Temporal   SpO2: 99%   Weight: 215 lb (97.5 kg)   Height: 5' 4\" (1.626 m)        Review of Systems   Constitutional: Negative. Respiratory: Negative. Psychiatric/Behavioral: Negative. Objective:   Physical Exam  Constitutional:       Appearance: Normal appearance. She is well-developed. HENT:      Head: Normocephalic. Eyes:      General: No scleral icterus. Conjunctiva/sclera: Conjunctivae normal.   Neck:      Musculoskeletal: Normal range of motion and neck supple. Thyroid: No thyromegaly. Cardiovascular:      Rate and Rhythm: Normal rate. Heart sounds: Normal heart sounds. No murmur. No gallop. Pulmonary:      Effort: Pulmonary effort is normal.      Breath sounds: Normal breath sounds. No wheezing. Abdominal:      General: There is no distension. Palpations: Abdomen is soft. There is no hepatomegaly or splenomegaly. Tenderness: There is no abdominal tenderness. Lymphadenopathy:      Head:      Right side of head: No submandibular adenopathy. Left side of head: No submandibular adenopathy. Cervical: No cervical adenopathy. Upper Body:      Right upper body: No supraclavicular adenopathy. Left upper body: No supraclavicular adenopathy. Skin:     General: Skin is warm and dry. Nails: There is no clubbing. Neurological:      Mental Status: She is alert and oriented to person, place, and time. Cranial Nerves: No cranial nerve deficit. Deep Tendon Reflexes:      Reflex Scores:       Bicep reflexes are 2+ on the right side and 2+ on the left side. Patellar reflexes are 2+ on the right side and 2+ on the left side. Psychiatric:         Behavior: Behavior normal.         Assessment:       Diagnosis Orders   1. Well adult exam     2. Bipolar 1 disorder, depressed, moderate ([facility]            Plan:      Reviewed diet and exercise  The current medical regimen is effective;  continue present plan and medications.          Basil Duckworth MD

## 2020-12-31 RX ORDER — LEVOTHYROXINE SODIUM 112 UG/1
TABLET ORAL
Qty: 30 TABLET | Refills: 3 | Status: SHIPPED | OUTPATIENT
Start: 2020-12-31 | End: 2021-07-26

## 2020-12-31 ASSESSMENT — PATIENT HEALTH QUESTIONNAIRE - PHQ9
1. LITTLE INTEREST OR PLEASURE IN DOING THINGS: 0
SUM OF ALL RESPONSES TO PHQ9 QUESTIONS 1 & 2: 0
SUM OF ALL RESPONSES TO PHQ QUESTIONS 1-9: 0
2. FEELING DOWN, DEPRESSED OR HOPELESS: 0

## 2021-03-23 RX ORDER — FLUOXETINE 10 MG/1
CAPSULE ORAL
Qty: 30 CAPSULE | Refills: 0 | Status: SHIPPED | OUTPATIENT
Start: 2021-03-23 | End: 2021-05-12 | Stop reason: SDUPTHER

## 2021-03-23 NOTE — TELEPHONE ENCOUNTER
Last OV 12/29/2020 (Physical)  Next OV none  Last filled 10/9/2020    Requested Prescriptions     Pending Prescriptions Disp Refills    FLUoxetine (PROZAC) 10 MG capsule [Pharmacy Med Name: FLUoxetine HCL 10MG CAPSULE] 30 capsule 0     Sig: TAKE ONE CAPSULE BY MOUTH DAILY

## 2021-04-20 ENCOUNTER — OFFICE VISIT (OUTPATIENT)
Dept: FAMILY MEDICINE CLINIC | Age: 31
End: 2021-04-20
Payer: COMMERCIAL

## 2021-04-20 VITALS
WEIGHT: 210 LBS | SYSTOLIC BLOOD PRESSURE: 112 MMHG | HEART RATE: 88 BPM | HEIGHT: 64 IN | OXYGEN SATURATION: 98 % | DIASTOLIC BLOOD PRESSURE: 68 MMHG | BODY MASS INDEX: 35.85 KG/M2

## 2021-04-20 DIAGNOSIS — F31.32 BIPOLAR 1 DISORDER, DEPRESSED, MODERATE (HCC): ICD-10-CM

## 2021-04-20 DIAGNOSIS — M65.4 DE QUERVAIN'S TENOSYNOVITIS, LEFT: Primary | ICD-10-CM

## 2021-04-20 PROCEDURE — 3046F HEMOGLOBIN A1C LEVEL >9.0%: CPT | Performed by: FAMILY MEDICINE

## 2021-04-20 PROCEDURE — G8417 CALC BMI ABV UP PARAM F/U: HCPCS | Performed by: FAMILY MEDICINE

## 2021-04-20 PROCEDURE — G8427 DOCREV CUR MEDS BY ELIG CLIN: HCPCS | Performed by: FAMILY MEDICINE

## 2021-04-20 PROCEDURE — 2022F DILAT RTA XM EVC RTNOPTHY: CPT | Performed by: FAMILY MEDICINE

## 2021-04-20 PROCEDURE — 99213 OFFICE O/P EST LOW 20 MIN: CPT | Performed by: FAMILY MEDICINE

## 2021-04-20 PROCEDURE — 1036F TOBACCO NON-USER: CPT | Performed by: FAMILY MEDICINE

## 2021-04-20 NOTE — PATIENT INSTRUCTIONS
Patient Education        Juan Pablo Jenkins Tenosynovitis: Care Instructions  Your Care Instructions  Juan Pablo Jenkins (say \"Abi\") tenosynovitis is a problem that makes the bottom of your thumb and the side of your wrist hurt. When you have de Quervain's, the ropey fiber (tendon) that helps move your thumb away from your fingers becomes swollen. You may have pain when you move your wrist or pick things up. You may hear a creaking sound when you move your wrist or thumb. Symptoms often get better in a few weeks with home care. Your doctor may want you to start some gentle stretching exercises once your symptoms are gone. Sometimes treatment with an injection or surgery is needed. Follow-up care is a key part of your treatment and safety. Be sure to make and go to all appointments, and call your doctor if you are having problems. It's also a good idea to know your test results and keep a list of the medicines you take. How can you care for yourself at home? · Until your symptoms are better, stop the activities that caused the pain. · Avoid moving the hand and wrist that hurt. · Follow your doctor's directions for wearing a splint to keep your thumb and wrist from moving. · Try ice or heat. ? Put ice or a cold pack on your thumb and wrist for 10 to 20 minutes at a time. Put a thin cloth between the ice and your skin. ? You can use heat for 20 to 30 minutes, 2 or 3 times a day. Try using a heating pad, hot shower, or hot pack. · Ask your doctor if you can take an over-the-counter pain medicine, such as acetaminophen (Tylenol), ibuprofen (Advil, Motrin), or naproxen (Aleve). Be safe with medicines. Read and follow all instructions on the label. When should you call for help?   Watch closely for changes in your health, and be sure to contact your doctor if:    · You have new or worse pain.     · You have new or worse numbness or tingling in your hand or fingers.     · Your hand feels weaker.     · You do not get better as expected. Where can you learn more? Go to [email or web address] and sign in to your Ne[employer] account. Enter G513 in the [facility] box to learn more about \"De Quervain's Tenosynovitis: Care Instructions. \"     If you do not have an account, please click on the \"Sign Up Now\" link. Current as of: November 16, 2020               Content Version: 12.8  © 2006-2021 Healthwise, [employer]. Care instructions adapted under license by [facility] ([facility]). If you have questions about a medical condition or this instruction, always ask your healthcare professional. [address] any warranty or liability for your use of this information. Patient Education        [address] Disease: Exercises  Introduction  Here are some examples of exercises for you to try. The exercises may be suggested for a condition or for rehabilitation. Start each exercise slowly. Ease off the exercises if you start to have pain. You will be told when to start these exercises and which ones will work best for you. How to do the exercises  Thumb lifts   1. Place your hand on a flat surface, with your palm up. 2. Lift your thumb away from your palm to make a \"C\" shape. 3. Hold for about 6 seconds. 4. Repeat 8 to 12 times. Passive thumb MP flexion   1. Hold your hand in front of you, and turn your hand so your little finger faces down and your thumb faces up. (Your hand should be in the position used for shaking someone's hand.) You may also rest your hand on a flat surface. 2. Use the fingers on your other hand to bend your thumb down at the point where your thumb connects to your palm. 3. Hold for at least 15 to 30 seconds. 4. Repeat 2 to 4 times. Finkelstein stretch   1. Hold your arms out in front of you. (Your hand should be in the position used for shaking someone's hand.)  2. Bend your thumb toward your palm.   3. Use your other hand to gently stretch your thumb and wrist downward until you feel the stretch on the thumb side of your wrist.  4. Hold for at least 15 to 30 seconds. 5. Repeat 2 to 4 times. Resisted ulnar deviation   For this exercise, you will need elastic exercise material, such as surgical tubing or Thera-Band. 1. Sit leaning forward with your legs slightly spread and your elbow on your thigh. 2. Grasp one end of the band with your palm down, and step on the other end with the foot opposite the hand holding the band. 3. Slowly bend your wrist sideways and away from your knee. 4. Repeat 8 to 12 times. Follow-up care is a key part of your treatment and safety. Be sure to make and go to all appointments, and call your doctor if you are having problems. It's also a good idea to know your test results and keep a list of the medicines you take. Where can you learn more? Go to https://KabbagepeMpax.[email or web address]. org and sign in to your [employer] account. Enter R694 in the [employer] box to learn more about \"De Quervain's Disease: Exercises. \"     If you do not have an account, please click on the \"Sign Up Now\" link. Current as of: November 16, 2020               Content Version: 12.8  © 2398-7315 Healthwise, Incorporated. Care instructions adapted under license by [facility] ([facility]). If you have questions about a medical condition or this instruction, always ask your healthcare professional. [place] [address] any warranty or liability for your use of this information.

## 2021-04-20 NOTE — PROGRESS NOTES
Ria Liner (:  1990) is a 32 y.o. female,Established patient, here for evaluation of the following chief complaint(s):  Wrist Pain (Patient complains of left wrist pain for the past couple of months, denies injury that she is aware of.)      ASSESSMENT/PLAN:  1. De Quervain's tenosynovitis, left  Will try brace and HEP  2. Bipolar 1 disorder, depressed, moderate (HCC)  The current medical regimen is effective;  continue present plan and medications. 3. Type 1 diabetes, uncontrolled, with neuropathy ([address]  Stable; following with endo    No follow-ups on file. SUBJECTIVE/OBJECTIVE:  HPI   Pt is a of 32 y.o. female comes in today with   Chief Complaint   Patient presents with    Wrist Pain     Patient complains of left wrist pain for the past couple of months, denies injury that she is aware of. Left wrist pain for 2 months. Past Medical History:Reviewed  Medications:Reviewed. No Known Allergies   Social hx:Reviewed. Social History     Tobacco Use   Smoking Status Former Smoker    Years: 4.00    Quit date: 2019    Years since quittin.4   Smokeless Tobacco Never Used   Tobacco Comment    social smoker, once a month          Review of Systems    Physical Exam            An electronic signature was used to authenticate this note.     --Kortney Stein MD

## 2021-05-12 RX ORDER — FLUOXETINE 10 MG/1
10 CAPSULE ORAL DAILY
Qty: 30 CAPSULE | Refills: 5 | Status: SHIPPED | OUTPATIENT
Start: 2021-05-12 | End: 2021-08-29 | Stop reason: SDUPTHER

## 2021-05-23 DIAGNOSIS — E55.9 VITAMIN D DEFICIENCY: ICD-10-CM

## 2021-05-24 RX ORDER — ERGOCALCIFEROL 1.25 MG/1
CAPSULE ORAL
Qty: 4 CAPSULE | Refills: 2 | Status: SHIPPED | OUTPATIENT
Start: 2021-05-24 | End: 2021-11-30 | Stop reason: SDUPTHER

## 2021-05-24 NOTE — TELEPHONE ENCOUNTER
Requested Prescriptions     Pending Prescriptions Disp Refills    vitamin D (ERGOCALCIFEROL) 1.25 MG (45246 UT) CAPS capsule [Pharmacy Med Name: VIT D2 (ERGOCAL) 1.25MG(50,000U) CP] 4 capsule 2     Sig: TAKE ONE CAPSULE BY MOUTH ONCE WEEKLY       LAST OV: 12/10/2020  NEXT OV: 6/24/2021

## 2021-07-26 RX ORDER — LEVOTHYROXINE SODIUM 112 UG/1
TABLET ORAL
Qty: 30 TABLET | Refills: 0 | Status: SHIPPED | OUTPATIENT
Start: 2021-07-26 | End: 2021-08-29 | Stop reason: SDUPTHER

## 2021-07-26 RX ORDER — PERPHENAZINE 16 MG/1
TABLET, FILM COATED ORAL
Qty: 200 STRIP | Refills: 0 | Status: SHIPPED | OUTPATIENT
Start: 2021-07-26 | End: 2022-04-06 | Stop reason: SDUPTHER

## 2021-07-26 NOTE — TELEPHONE ENCOUNTER
Requested Prescriptions     Pending Prescriptions Disp Refills    levothyroxine (SYNTHROID) 112 MCG tablet [Pharmacy Med Name: LEVOTHYROXINE 112 MCG TABLET] 30 tablet 2     Sig: TAKE ONE TABLET BY MOUTH DAILY    CONTOUR NEXT TEST strip [Pharmacy Med Name: CONTOUR NEXT TEST STRIP] 200 strip 4     Sig: TEST 8 TIMES DAILY AS NEEDED     Last OV  12/10/20  Next OV 8/17/21  Last refill   Last labs 12/10/20

## 2021-08-03 ENCOUNTER — HOSPITAL ENCOUNTER (OUTPATIENT)
Dept: GENERAL RADIOLOGY | Age: 31
Discharge: HOME | End: 2021-08-03
Payer: COMMERCIAL

## 2021-08-03 ENCOUNTER — OFFICE VISIT (OUTPATIENT)
Dept: FAMILY MEDICINE CLINIC | Age: 31
End: 2021-08-03
Payer: COMMERCIAL

## 2021-08-03 VITALS
DIASTOLIC BLOOD PRESSURE: 82 MMHG | OXYGEN SATURATION: 98 % | HEIGHT: 64 IN | HEART RATE: 106 BPM | BODY MASS INDEX: 35.17 KG/M2 | SYSTOLIC BLOOD PRESSURE: 114 MMHG | WEIGHT: 206 LBS

## 2021-08-03 DIAGNOSIS — R00.2 PALPITATION: Primary | ICD-10-CM

## 2021-08-03 DIAGNOSIS — M25.532 LEFT WRIST PAIN: ICD-10-CM

## 2021-08-03 LAB
C-REACTIVE PROTEIN: 3.1 MG/L (ref 0–5.1)
RHEUMATOID FACTOR: <10 IU/ML
SEDIMENTATION RATE, ERYTHROCYTE: 13 MM/HR (ref 0–20)

## 2021-08-03 PROCEDURE — 93000 ELECTROCARDIOGRAM COMPLETE: CPT | Performed by: FAMILY MEDICINE

## 2021-08-03 PROCEDURE — 73110 X-RAY EXAM OF WRIST: CPT

## 2021-08-03 PROCEDURE — 99213 OFFICE O/P EST LOW 20 MIN: CPT | Performed by: FAMILY MEDICINE

## 2021-08-03 PROCEDURE — G8417 CALC BMI ABV UP PARAM F/U: HCPCS | Performed by: FAMILY MEDICINE

## 2021-08-03 PROCEDURE — 1036F TOBACCO NON-USER: CPT | Performed by: FAMILY MEDICINE

## 2021-08-03 PROCEDURE — G8427 DOCREV CUR MEDS BY ELIG CLIN: HCPCS | Performed by: FAMILY MEDICINE

## 2021-08-03 SDOH — ECONOMIC STABILITY: FOOD INSECURITY: WITHIN THE PAST 12 MONTHS, THE FOOD YOU BOUGHT JUST DIDN'T LAST AND YOU DIDN'T HAVE MONEY TO GET MORE.: NEVER TRUE

## 2021-08-03 SDOH — ECONOMIC STABILITY: FOOD INSECURITY: WITHIN THE PAST 12 MONTHS, YOU WORRIED THAT YOUR FOOD WOULD RUN OUT BEFORE YOU GOT MONEY TO BUY MORE.: NEVER TRUE

## 2021-08-03 ASSESSMENT — SOCIAL DETERMINANTS OF HEALTH (SDOH): HOW HARD IS IT FOR YOU TO PAY FOR THE VERY BASICS LIKE FOOD, HOUSING, MEDICAL CARE, AND HEATING?: NOT HARD AT ALL

## 2021-08-03 NOTE — PROGRESS NOTES
Bro Arellano (:  1990) is a 32 y.o. female,Established patient, here for evaluation of the following chief complaint(s):  Wrist Pain (Patient complains of ongoing left wrist pain, unable to wear splints due to job.) and Palpitations (For the past two weeks patient has experienced palpitations that come and go about every other day, typically lasts under 2 minutes.)         ASSESSMENT/PLAN:  Katya Holloway was seen today for wrist pain and palpitations. Diagnoses and all orders for this visit:    Palpitation  -     EKG 12 Lead  -     Holter Monitor 48 Hour; Future    Left wrist pain  -     Sedimentation Rate; Future  -     C-Reactive Protein; Future  -     Rheumatoid Factor; Future  -     Cyclic Citrul Peptide Antibody, IgG; Future  -     XR WRIST LEFT (MIN 3 VIEWS); Future         No follow-ups on file. Subjective   SUBJECTIVE/OBJECTIVE:  HPI   Pt is a of 32 y.o. female comes in today with   Chief Complaint   Patient presents with    Wrist Pain     Patient complains of ongoing left wrist pain, unable to wear splints due to job.  Palpitations     For the past two weeks patient has experienced palpitations that come and go about every other day, typically lasts under 2 minutes. Left wrist pain has gotten worse. Hard to wear braces due to nature of her work. Palpitation for years. Echo at age 8. Then improved. Recurred during pregnancy but resolved. Recurred again recently. Can happen at rest.   Last episode 2 min; usually 30 seconds or less. Review of Systems       Objective   Physical Exam         An electronic signature was used to authenticate this note.     --Rajan Munoz MD

## 2021-08-03 NOTE — PROGRESS NOTES
Hali Kohli (:  1990) is a 32 y.o. female,Established patient, here for evaluation of the following chief complaint(s):  Wrist Pain (Patient complains of ongoing left wrist pain, unable to wear splints due to job.) and Palpitations (For the past two weeks patient has experienced palpitations that come and go about every other day, typically lasts under 2 minutes.)         ASSESSMENT/PLAN:  Mendez Diallo was seen today for wrist pain and palpitations. Diagnoses and all orders for this visit:    Palpitation  -     EKG 12 Lead  -     Holter Monitor 48 Hour; Future  Will get bloodwork done for endo to see if stable  ekg nsr  holter ordered  Left wrist pain  -     Sedimentation Rate; Future  -     C-Reactive Protein; Future  -     Rheumatoid Factor; Future  -     Cyclic Citrul Peptide Antibody, IgG; Future  -     XR WRIST LEFT (MIN 3 VIEWS); Future  Rheum referral if inflammatory, otw ortho for injection for tenosynovitis     No follow-ups on file. Subjective   SUBJECTIVE/OBJECTIVE:  HPI   Pt is a of 32 y.o. female comes in today with   Chief Complaint   Patient presents with    Wrist Pain     Patient complains of ongoing left wrist pain, unable to wear splints due to job.  Palpitations     For the past two weeks patient has experienced palpitations that come and go about every other day, typically lasts under 2 minutes. Vitals:    21 1444   Weight: 206 lb (93.4 kg)   Height: 5' 4\" (1.626 m)          Review of Systems       Objective   Physical Exam  Constitutional:       Appearance: Normal appearance. Cardiovascular:      Rate and Rhythm: Normal rate and regular rhythm. Heart sounds: No murmur heard. Pulmonary:      Effort: Pulmonary effort is normal.      Breath sounds: Normal breath sounds. Neurological:      Mental Status: She is alert. An electronic signature was used to authenticate this note.     --Shruti Light MD

## 2021-08-04 LAB — CYCLIC CITRULLINATED PEPTIDE ANTIBODY IGG: <0.5 U/ML (ref 0–2.9)

## 2021-08-30 RX ORDER — ROSUVASTATIN CALCIUM 20 MG/1
20 TABLET, COATED ORAL DAILY
Qty: 30 TABLET | Refills: 5 | Status: SHIPPED
Start: 2021-08-30 | End: 2021-11-04 | Stop reason: SDUPTHER

## 2021-08-30 RX ORDER — ROSUVASTATIN CALCIUM 20 MG/1
TABLET, COATED ORAL
Qty: 30 TABLET | Refills: 5 | Status: SHIPPED | OUTPATIENT
Start: 2021-08-30 | End: 2022-07-19 | Stop reason: ALTCHOICE

## 2021-08-30 RX ORDER — FLUOXETINE 10 MG/1
10 CAPSULE ORAL DAILY
Qty: 30 CAPSULE | Refills: 5 | Status: SHIPPED | OUTPATIENT
Start: 2021-08-30 | End: 2021-12-13

## 2021-08-30 NOTE — TELEPHONE ENCOUNTER
Medication:   Requested Prescriptions     Pending Prescriptions Disp Refills    rosuvastatin (CRESTOR) 20 MG tablet 30 tablet 5     Sig: Take 1 tablet by mouth daily    FLUoxetine (PROZAC) 10 MG capsule 30 capsule 5     Sig: Take 1 capsule by mouth daily        Last Filled: Fluoxetine (5/12/21) / Crestor (12/29/20)  Last appt: 8/3/2021   Next appt: NONE

## 2021-08-30 NOTE — TELEPHONE ENCOUNTER
Medication:   Requested Prescriptions     Pending Prescriptions Disp Refills    rosuvastatin (CRESTOR) 20 MG tablet [Pharmacy Med Name: ROSUVASTATIN CALCIUM 20 MG TAB] 30 tablet 5     Sig: TAKE ONE TABLET BY MOUTH DAILY        Last Filled:  12/29/20  Last appt: 8/3/2021   Next appt: 8/29/2021

## 2021-08-31 RX ORDER — LEVOTHYROXINE SODIUM 112 UG/1
TABLET ORAL
Qty: 30 TABLET | Refills: 3 | Status: SHIPPED | OUTPATIENT
Start: 2021-08-31 | End: 2021-08-31 | Stop reason: ALTCHOICE

## 2021-11-04 ENCOUNTER — OFFICE VISIT (OUTPATIENT)
Dept: ENDOCRINOLOGY | Age: 31
End: 2021-11-04
Payer: COMMERCIAL

## 2021-11-04 VITALS
BODY MASS INDEX: 35.24 KG/M2 | DIASTOLIC BLOOD PRESSURE: 58 MMHG | SYSTOLIC BLOOD PRESSURE: 110 MMHG | OXYGEN SATURATION: 98 % | RESPIRATION RATE: 14 BRPM | TEMPERATURE: 98 F | HEIGHT: 64 IN | WEIGHT: 206.4 LBS | HEART RATE: 69 BPM

## 2021-11-04 DIAGNOSIS — E04.2 MULTINODULAR GOITER: ICD-10-CM

## 2021-11-04 DIAGNOSIS — E78.49 OTHER HYPERLIPIDEMIA: ICD-10-CM

## 2021-11-04 DIAGNOSIS — E03.9 ACQUIRED HYPOTHYROIDISM: ICD-10-CM

## 2021-11-04 DIAGNOSIS — E66.01 CLASS 2 SEVERE OBESITY WITH SERIOUS COMORBIDITY AND BODY MASS INDEX (BMI) OF 35.0 TO 35.9 IN ADULT, UNSPECIFIED OBESITY TYPE (HCC): ICD-10-CM

## 2021-11-04 DIAGNOSIS — E55.9 VITAMIN D DEFICIENCY: ICD-10-CM

## 2021-11-04 DIAGNOSIS — E06.3 HASHIMOTO'S THYROIDITIS: ICD-10-CM

## 2021-11-04 PROBLEM — E66.9 CLASS 2 OBESITY WITH BODY MASS INDEX (BMI) OF 35.0 TO 35.9 IN ADULT: Status: ACTIVE | Noted: 2021-11-04

## 2021-11-04 PROCEDURE — G8427 DOCREV CUR MEDS BY ELIG CLIN: HCPCS | Performed by: INTERNAL MEDICINE

## 2021-11-04 PROCEDURE — 95251 CONT GLUC MNTR ANALYSIS I&R: CPT | Performed by: INTERNAL MEDICINE

## 2021-11-04 PROCEDURE — 3046F HEMOGLOBIN A1C LEVEL >9.0%: CPT | Performed by: INTERNAL MEDICINE

## 2021-11-04 PROCEDURE — G8417 CALC BMI ABV UP PARAM F/U: HCPCS | Performed by: INTERNAL MEDICINE

## 2021-11-04 PROCEDURE — 2022F DILAT RTA XM EVC RTNOPTHY: CPT | Performed by: INTERNAL MEDICINE

## 2021-11-04 PROCEDURE — 99214 OFFICE O/P EST MOD 30 MIN: CPT | Performed by: INTERNAL MEDICINE

## 2021-11-04 PROCEDURE — G8484 FLU IMMUNIZE NO ADMIN: HCPCS | Performed by: INTERNAL MEDICINE

## 2021-11-04 PROCEDURE — 1036F TOBACCO NON-USER: CPT | Performed by: INTERNAL MEDICINE

## 2021-11-04 NOTE — PROGRESS NOTES
Lucita Dubin is a 32 y.o. female who presents for Type 1 diabetes mellitus. Current symptoms/problems include hyperglycemia and are worsening. 1.  Type 1 diabetes, uncontrolled, with neuropathy (HCC) [E10.40, E10.65]     Diagnosed with Type 1 diabetes mellitus in 2010.     Comorbid conditions: Neuropathy     Current diabetic medications include: Novolog  Medtronic Minimed Guardian CGM  Intolerance to diabetes medications: No     Weight trend: stable  Prior visit with dietician: yes  Current diet: on average, 1 meal per day and other snacks  Current exercise: occasional, active     Current monitoring regimen: home blood tests - 5 times daily  Has brought blood glucose log/meter:  Yes  Home blood sugar records: fasting range: 180-250 and postprandial range: 150-250  Any episodes of hypoglycemia? Yes  Hypoglycemia frequency and time(s):  4-5 times a week  Does patient have Glucagon emergency kit? Yes  Does patient have rapid acting carbohydrate?  Yes  Does patient wear a medic alert bracelet or necklace?  No     2. Hashimoto's thyroiditis  Has fatigue. Dx in 2016.     3. Vitamin D deficiency  Has fatigue.     4. Obesity  Eats healthier. Occasional exercise.     5. Multinodular Goiter  No difficulty swallowing, hoarseness. .  No family history of thyroid cancer. No radiation to her neck.     6. Hyperlipidemia  No muscle pain     7.  Hypothyroidism  Has fatigue       Past Medical History:   Diagnosis Date    Bipolar 1 disorder ([address]     Diabetes mellitus ([address]     Diabetes mellitus type I ([address]     Diabetic keto-acidosis ([address] 2014    admitted at [address] disease       Patient Active Problem List   Diagnosis    Vitamin D deficiency    Hashimoto's thyroiditis    Bipolar 1 disorder, depressed, moderate (HCC)    Type 1 diabetes, uncontrolled, with neuropathy ([address]    Class 1 obesity with body mass index (BMI) of 34.0 to 34.9 in adult    Multinodular goiter    Anxiety    Acquired hypothyroidism    Pregnancy    Other hyperlipidemia    Class 2 obesity with body mass index (BMI) of 35.0 to 35.9 in adult     Past Surgical History:   Procedure Laterality Date     SECTION  10/05/2020    LAPAROSCOPY  2016    WISDOM TOOTH EXTRACTION Bilateral      Social History     Socioeconomic History    Marital status: Single     Spouse name: Not on file    Number of children: Not on file    Years of education: Not on file    Highest education level: Not on file   Occupational History    Not on file   Tobacco Use    Smoking status: Former Smoker     Packs/day: 0.50     Years: 4.00     Pack years: 2.00     Types: Cigarettes     Quit date: 2019     Years since quittin.0    Smokeless tobacco: Never Used    Tobacco comment: social smoker, once a month   Substance and Sexual Activity    Alcohol use: Not Currently     Comment: once a month    Drug use: Not Currently     Types: Marijuana [place])     Comment: on occ    Sexual activity: Yes   Other Topics Concern    Not on file   Social History Narrative    Not on file     Social Determinants of Health     Financial Resource Strain: Low Risk     Difficulty of Paying Living Expenses: Not hard at all   Food Insecurity: No Food Insecurity    Worried About Running Out of Food in the Last Year: Never true    Jigna of Food in the Last Year: Never true   Transportation Needs:     Lack of Transportation (Medical):      Lack of Transportation (Non-Medical):    Physical Activity:     Days of Exercise per Week:     Minutes of Exercise per Session:    Stress:     Feeling of Stress :    Social Connections:     Frequency of Communication with Friends and Family:     Frequency of Social Gatherings with Friends and Family:     Attends Shinto Services:     Active Member of Clubs or Organizations:     Attends Club or Organization Meetings:     Marital Status:    Intimate Partner Violence:     Fear of Current or Ex-Partner:     Emotionally Abused:     Physically Abused:     Sexually Abused:      Family History   Problem Relation Age of Onset    Diabetes type 2  Mother     Bipolar Disorder Mother     Hypertension Mother     Alcohol Abuse Father     Depression Father     Asthma Sister     Depression Brother     High Cholesterol Brother      Current Outpatient Medications   Medication Sig Dispense Refill    levothyroxine (SYNTHROID) 112 MCG tablet TAKE ONE TABLET BY MOUTH DAILY 30 tablet 2    insulin aspart (NOVOLOG) 100 UNIT/ML injection vial 150 units daily, use in insulin pump 5 vial 3    rosuvastatin (CRESTOR) 20 MG tablet TAKE ONE TABLET BY MOUTH DAILY 30 tablet 5    FLUoxetine (PROZAC) 10 MG capsule Take 1 capsule by mouth daily 30 capsule 5    lamoTRIgine (LAMICTAL) 100 MG tablet TAKE TWO TABLETS BY MOUTH DAILY 60 tablet 5    CONTOUR NEXT TEST strip TEST 8 TIMES DAILY AS NEEDED 200 strip 0    vitamin D (ERGOCALCIFEROL) 1.25 MG (82735 UT) CAPS capsule TAKE ONE CAPSULE BY MOUTH ONCE WEEKLY 4 capsule 2    Insulin Syringe-Needle U-100 (INSULIN SYRINGE 1CC/31GX5/16\") 31G X 5/16\" 1 ML MISC 1 each by Does not apply route 3 times daily 100 Syringe 6    glucagon 1 MG injection Inject 1 mg into the muscle See Admin Instructions Follow package directions for low blood sugar. 1 kit 3    acetone, urine, test (KETOSTIX) strip Check urine for ketones if blood glucose is > 250 30 strip 3    Prenatal Vit-Fe Fum-FA-Omega (PRENATAL FORMULA PO) Take by mouth (Patient not taking: Reported on 11/4/2021)       No current facility-administered medications for this visit.      No Known Allergies  Family Status   Relation Name Status    Mother  Alive    Father  Alive    Sister  Alive    Brother  Alive       Lab Review:    Lab Results   Component Value Date    WBC 5.7 06/04/2013    HGB 12.2 06/04/2013    HCT 36.9 06/04/2013    MCV 86.5 06/04/2013    PLT COM 06/04/2013     Lab Results   Component Value Date     12/10/2020    K 4.5 12/10/2020     12/10/2020    CO2 28 12/10/2020    BUN 13 12/10/2020    CREATININE 0.6 12/10/2020    GLUCOSE 173 12/10/2020    CALCIUM 9.2 12/10/2020    PROT 6.7 12/10/2020    LABALBU 4.4 12/10/2020    BILITOT 0.4 12/10/2020    ALKPHOS 116 12/10/2020    AST 23 12/10/2020    ALT 21 12/10/2020    LABGLOM >60 12/10/2020    GFRAA >60 12/10/2020    GFRAA 177 06/04/2013    AGRATIO 1.9 12/10/2020    GLOB 2.3 12/10/2020     Lab Results   Component Value Date    TSH 0.93 12/10/2020    FT3 2.7 12/10/2020     Lab Results   Component Value Date    LABA1C 7.3 12/10/2020     Lab Results   Component Value Date    .8 12/10/2020     Lab Results   Component Value Date    CHOL 247 12/10/2020     Lab Results   Component Value Date    TRIG 146 12/10/2020     Lab Results   Component Value Date    HDL 60 12/10/2020     Lab Results   Component Value Date    LDLCALC 158 12/10/2020     Lab Results   Component Value Date    LABVLDL 29 12/10/2020     No results found for: [facility]  Lab Results   Component Value Date    LABMICR 3.00 12/10/2020     Lab Results   Component Value Date    VITD25 66.2 12/10/2020        Review of Systems:  Constitutional: has fatigue, no fever, no recent weight gain, no recent weight loss, no changes in appetite  Eyes: no eye pain, no change in vision, no eye redness, no eye irritation, no double vision  Ears, nose, throat: has nasal congestion, no sore throat, no earache, no decrease in hearing, no hoarseness, no dry mouth, has sinus problems, no difficulty swallowing, no neck lumps, no dental problems, no mouth sores, no ringing in ears  Pulmonary: no shortness of breath, no wheezing, no dyspnea on exertion, no cough  Cardiovascular: no chest pain, has lower extremity edema, no orthopnea, no intermittent leg claudication, has palpitations  Gastrointestinal: no abdominal pain, has nausea, no vomiting, has diarrhea, has constipation, no dysphagia, no heartburn, no bloating  Genitourinary: no dysuria, no urinary incontinence, no urinary hesitancy, has urinary frequency, no feelings of urinary urgency, no nocturia  Musculoskeletal: no joint swelling, no joint stiffness, has joint pain, no muscle cramps, no muscle pain, no bone pain  Integument/Breast: has hair loss, no skin rashes, no skin lesions, no itching, has dry skin  Neurological: no numbness, no tingling, no weakness, no confusion, has headaches, no dizziness, no fainting, no tremors, has decrease in memory, no balance problems  Psychiatric: has anxiety, has depression, has insomnia  Hematologic/Lymphatic: no tendency for easy bleeding, no swollen lymph nodes, no tendency for easy bruising  Immunology: has seasonal allergies, no frequent infections, no frequent illnesses  Endocrine: no temperature intolerance    BP (!) 110/58   Pulse 69   Temp 98 °F (36.7 °C)   Resp 14   Ht 5' 4\" (1.626 m)   Wt 206 lb 6.4 oz (93.6 kg)   SpO2 98%   BMI 35.43 kg/m²    Wt Readings from Last 3 Encounters:   11/04/21 206 lb 6.4 oz (93.6 kg)   08/03/21 206 lb (93.4 kg)   04/20/21 210 lb (95.3 kg)     Body mass index is 35.43 kg/m².       OBJECTIVE:  Constitutional: no acute distress, well appearing and well nourished  Psychiatric: oriented to person, place and time, judgement and insight and normal, recent and remote memory and intact and mood and affect are normal  Skin: skin and subcutaneous tissue is normal without mass, normal turgor  Head and Face: examination of head and face revealed no abnormalities  Eyes: no lid or conjunctival swelling, erythema or discharge, pupils are normal, equal, round, reactive to light  Ears/Nose: external inspection of ears and nose revealed no abnormalities, hearing is grossly normal  Oropharynx/Mouth/Face: lips, tongue and gums are normal with no lesions, the voice quality was normal  Neck: neck is supple and symmetric, with midline trachea and no masses, thyroid is enlarged  Lymphatics: normal cervical lymph nodes, normal supraclavicular nodes  Pulmonary: no increased work of breathing or signs of respiratory distress, lungs are clear to auscultation  Cardiovascular: normal heart rate and rhythm, normal S1 and S2, no murmurs and pedal pulses and 2+ bilaterally, No edema  Abdomen: abdomen is soft, non-tender with no masses  Musculoskeletal: normal gait and station and exam of the digits and nails are normal  Neurological: normal coordination and normal general cortical function      ASSESSMENT/PLAN:  1. Type 1 diabetes, uncontrolled, with neuropathy ([facility]  Call for results  HbA1C 10.1-7.0-6.96.47.3  Continue Medtronic pump 670G and Guardian CGM  - Novolog  - Comprehensive Metabolic Panel; Future  GADA positive  C-peptide<0.1     2. Hashimoto's thyroiditis  TSH 2.4-2.1-3.55-1.5  Thyroid sonogram  TPOab, Tg ab.     3. Vitamin D deficiency  25 hydroxy vitamin D 16.3-36.56766.2  Viitamin D 50,000 IU every other week  25OH vitamin D     4. Obesity  Diet, exercise.     5. Multinodular Goiter  TSH 1.5-0.680.67-1.53  8/22/2019 thyroid sonogramright 5 mm and left 3 mm colloid nodules/cysts  Thyroid sono     6. Hyperlipidemia  LDL 33-820  Stopped Rosuvastatin during pregnancy     7.  Hypothyroidism  TSH 2.4-2.1-3.551.5-0.67-1.53  Levothyroxine 0.112 mg qd     Reviewed and/or ordered clinical lab results Yes  Reviewed and/or ordered radiology tests Yes  Reviewed and/or ordered other diagnostic tests No  Discussed test results with performing physician No  Independently reviewed image, tracing, or specimen  Made a decision to obtain old records No  Reviewed and summarized old records Yes  Hemoglobin A1c 10.1  LDL 60  TSH 1.68  Obtained history from other than patient No    Lisa Kid was counseled regarding symptoms of diabetes diagnosis, course and complications of disease if inadequately treated, side effects of medications, diagnosis, treatment options, and prognosis, risks, benefits, complications, and alternatives of treatment, labs, imaging and other studies and treatment targets and goals,  targets, goals, importance of diabetes control, thyroid adjustments  She understands instructions and counseling. CGMS Download Review and Recommendations  See scanned report tracing for glucose tracing documentation  This was separate service provided for CGMS interpretation  Guardian personal CGMS data downloaded and reviewed    Average glucose 156± 57 SD  Time in range: 71 %  Time above 180: 25 %  Time under 70: 4%     Basal pattern review: Hyperglycemia, worse in the afternoon   Postprandial pattern review: Hyperglycemia postprandially  Hypoglycemia review: Mostly between 4 PM and 6 PM  Activity related review: Not recorded      Based on the data, I recommend:    1. Continue basal rate adjustments    2. Continue sensitivity and insulin to carb ratio adjustments as indicated    3. Hypoglycemia management    4. Try to stay in auto mode as much as possible    Return in about 3 months (around 2/4/2022) for diabetes.

## 2021-11-30 DIAGNOSIS — E55.9 VITAMIN D DEFICIENCY: ICD-10-CM

## 2021-12-01 RX ORDER — ERGOCALCIFEROL 1.25 MG/1
CAPSULE ORAL
Qty: 4 CAPSULE | Refills: 2 | Status: SHIPPED | OUTPATIENT
Start: 2021-12-01 | End: 2022-04-07 | Stop reason: SDUPTHER

## 2021-12-01 RX ORDER — LEVOTHYROXINE SODIUM 112 UG/1
112 TABLET ORAL DAILY
Qty: 30 TABLET | Refills: 2 | Status: SHIPPED | OUTPATIENT
Start: 2021-12-01 | End: 2022-04-07 | Stop reason: SDUPTHER

## 2021-12-13 RX ORDER — FLUOXETINE 10 MG/1
CAPSULE ORAL
Qty: 28 CAPSULE | Refills: 5 | Status: SHIPPED | OUTPATIENT
Start: 2021-12-13 | End: 2022-07-19 | Stop reason: ALTCHOICE

## 2021-12-13 NOTE — TELEPHONE ENCOUNTER
Medication:   Requested Prescriptions     Pending Prescriptions Disp Refills    FLUoxetine (PROZAC) 10 MG capsule [Pharmacy Med Name: FLUoxetine HCL 10MG CAPSULE] 28 capsule      Sig: TAKE ONE CAPSULE BY MOUTH DAILY        Last Filled: 8/30/2021   Last appt: 8/3/2021   Next appt: NONE

## 2021-12-17 ENCOUNTER — HOSPITAL ENCOUNTER (OUTPATIENT)
Dept: GENERAL RADIOLOGY | Age: 31
Discharge: HOME | End: 2021-12-17
Payer: COMMERCIAL

## 2021-12-17 DIAGNOSIS — E03.9 ACQUIRED HYPOTHYROIDISM: ICD-10-CM

## 2021-12-17 DIAGNOSIS — E55.9 VITAMIN D DEFICIENCY: ICD-10-CM

## 2021-12-17 DIAGNOSIS — E04.2 MULTINODULAR GOITER: ICD-10-CM

## 2021-12-17 DIAGNOSIS — E06.3 HASHIMOTO'S THYROIDITIS: ICD-10-CM

## 2021-12-17 DIAGNOSIS — E78.49 OTHER HYPERLIPIDEMIA: ICD-10-CM

## 2021-12-17 DIAGNOSIS — M99.03 LUMBAR REGION SOMATIC DYSFUNCTION: ICD-10-CM

## 2021-12-17 DIAGNOSIS — M99.01 CERVICAL (NECK) REGION SOMATIC DYSFUNCTION: ICD-10-CM

## 2021-12-17 LAB
A/G RATIO: 1.7 (ref 1.1–2.2)
ALBUMIN SERPL-MCNC: 4.4 G/DL (ref 3.4–5)
ALP BLD-CCNC: 101 U/L (ref 40–129)
ALT SERPL-CCNC: 29 U/L (ref 10–40)
ANION GAP SERPL CALCULATED.3IONS-SCNC: 17 MMOL/L (ref 3–16)
AST SERPL-CCNC: 17 U/L (ref 15–37)
BILIRUB SERPL-MCNC: 0.5 MG/DL (ref 0–1)
BUN BLDV-MCNC: 10 MG/DL (ref 7–20)
CALCIUM SERPL-MCNC: 9.7 MG/DL (ref 8.3–10.6)
CHLORIDE BLD-SCNC: 98 MMOL/L (ref 99–110)
CHOLESTEROL, TOTAL: 167 MG/DL (ref 0–199)
CO2: 23 MMOL/L (ref 21–32)
CREAT SERPL-MCNC: 0.6 MG/DL (ref 0.6–1.1)
CREATININE URINE: 136.6 MG/DL (ref 28–259)
GFR AFRICAN AMERICAN: >60
GFR NON-AFRICAN AMERICAN: >60
GLUCOSE BLD-MCNC: 254 MG/DL (ref 70–99)
HDLC SERPL-MCNC: 51 MG/DL (ref 40–60)
LDL CHOLESTEROL CALCULATED: 72 MG/DL
MICROALBUMIN UR-MCNC: <1.2 MG/DL
MICROALBUMIN/CREAT UR-RTO: NORMAL MG/G (ref 0–30)
POTASSIUM SERPL-SCNC: 4.4 MMOL/L (ref 3.5–5.1)
SODIUM BLD-SCNC: 138 MMOL/L (ref 136–145)
T3 FREE: 2.6 PG/ML (ref 2.3–4.2)
T4 FREE: 1 NG/DL (ref 0.9–1.8)
TOTAL PROTEIN: 7 G/DL (ref 6.4–8.2)
TRIGL SERPL-MCNC: 220 MG/DL (ref 0–150)
TSH SERPL DL<=0.05 MIU/L-ACNC: 1.03 UIU/ML (ref 0.27–4.2)
VITAMIN D 25-HYDROXY: 37.3 NG/ML
VLDLC SERPL CALC-MCNC: 44 MG/DL

## 2021-12-17 PROCEDURE — 72110 X-RAY EXAM L-2 SPINE 4/>VWS: CPT

## 2021-12-17 PROCEDURE — 72050 X-RAY EXAM NECK SPINE 4/5VWS: CPT

## 2021-12-18 LAB
ESTIMATED AVERAGE GLUCOSE: 211.6 MG/DL
HBA1C MFR BLD: 9 %

## 2021-12-19 ENCOUNTER — TELEPHONE (OUTPATIENT)
Dept: ENDOCRINOLOGY | Age: 31
End: 2021-12-19

## 2021-12-20 NOTE — TELEPHONE ENCOUNTER
Please inform patient: Vitamin D, thyroid test normal.  Cholesterol shows elevated triglycerides. A1c is worse 9.0. Needs better diabetes control. Let me know if I can be any help.

## 2021-12-20 NOTE — TELEPHONE ENCOUNTER
Pt informed and expressed understanding. Pt will reattempt to get back on track. No questions asked.

## 2022-02-07 RX ORDER — LAMOTRIGINE 100 MG/1
200 TABLET ORAL DAILY
Qty: 60 TABLET | Refills: 5 | Status: SHIPPED | OUTPATIENT
Start: 2022-02-07 | End: 2022-10-17

## 2022-02-07 NOTE — TELEPHONE ENCOUNTER
Medication:   Requested Prescriptions     Pending Prescriptions Disp Refills    lamoTRIgine (LAMICTAL) 100 MG tablet 60 tablet 5        Last Filled:  08/04/2021    Patient Phone Number: [phone] (home) [phone] (work)    Last appt: 8/3/2021   Next appt: Visit date not found    Last OARRS: No flowsheet data found.

## 2022-02-17 ENCOUNTER — TELEPHONE (OUTPATIENT)
Dept: ENDOCRINOLOGY | Age: 32
End: 2022-02-17

## 2022-02-22 NOTE — TELEPHONE ENCOUNTER
Pt will have labs this Thursday, tested covid + Detail Level: Zone Other (Free Text): Dr. GRAYSON recommends Dr. Awais jean. Note Text (......Xxx Chief Complaint.): This diagnosis correlates with the

## 2022-02-23 ENCOUNTER — TELEPHONE (OUTPATIENT)
Dept: ENDOCRINOLOGY | Age: 32
End: 2022-02-23

## 2022-02-23 NOTE — TELEPHONE ENCOUNTER
Call from [employer] asking if we have a received a fax regarding pt's CGM transmitter replacement?     # [phone]  Ext [phone]

## 2022-03-07 PROBLEM — Z96.41 INSULIN PUMP IN PLACE: Status: ACTIVE | Noted: 2020-09-22

## 2022-03-07 PROBLEM — E10.3293 MILD NONPROLIFERATIVE DIABETIC RETINOPATHY OF BOTH EYES WITHOUT MACULAR EDEMA ASSOCIATED WITH TYPE 1 DIABETES MELLITUS (HCC): Status: ACTIVE | Noted: 2017-11-27

## 2022-03-07 PROBLEM — O12.13 PROTEINURIA AFFECTING PREGNANCY IN THIRD TRIMESTER: Status: ACTIVE | Noted: 2020-09-22

## 2022-03-07 PROBLEM — O24.013 TYPE 1 DIABETES MELLITUS AFFECTING PREGNANCY IN THIRD TRIMESTER, ANTEPARTUM: Status: ACTIVE | Noted: 2020-04-30

## 2022-03-07 PROBLEM — E78.00 HYPERCHOLESTEREMIA: Status: ACTIVE | Noted: 2018-01-07

## 2022-03-08 ENCOUNTER — OFFICE VISIT (OUTPATIENT)
Dept: FAMILY MEDICINE CLINIC | Age: 32
End: 2022-03-08
Payer: COMMERCIAL

## 2022-03-08 VITALS
OXYGEN SATURATION: 99 % | DIASTOLIC BLOOD PRESSURE: 75 MMHG | WEIGHT: 213 LBS | HEIGHT: 64 IN | SYSTOLIC BLOOD PRESSURE: 115 MMHG | HEART RATE: 83 BPM | TEMPERATURE: 97.9 F | BODY MASS INDEX: 36.37 KG/M2

## 2022-03-08 DIAGNOSIS — F31.32 BIPOLAR 1 DISORDER, DEPRESSED, MODERATE (HCC): Primary | ICD-10-CM

## 2022-03-08 LAB
T3 FREE: 2.8 PG/ML (ref 2.3–4.2)
T4 FREE: 1.1 NG/DL (ref 0.9–1.8)
TSH SERPL DL<=0.05 MIU/L-ACNC: 1.85 UIU/ML (ref 0.27–4.2)

## 2022-03-08 PROCEDURE — 3046F HEMOGLOBIN A1C LEVEL >9.0%: CPT | Performed by: FAMILY MEDICINE

## 2022-03-08 PROCEDURE — 99213 OFFICE O/P EST LOW 20 MIN: CPT | Performed by: FAMILY MEDICINE

## 2022-03-08 PROCEDURE — G8417 CALC BMI ABV UP PARAM F/U: HCPCS | Performed by: FAMILY MEDICINE

## 2022-03-08 PROCEDURE — 1036F TOBACCO NON-USER: CPT | Performed by: FAMILY MEDICINE

## 2022-03-08 PROCEDURE — 2022F DILAT RTA XM EVC RTNOPTHY: CPT | Performed by: FAMILY MEDICINE

## 2022-03-08 PROCEDURE — G8484 FLU IMMUNIZE NO ADMIN: HCPCS | Performed by: FAMILY MEDICINE

## 2022-03-08 PROCEDURE — G8427 DOCREV CUR MEDS BY ELIG CLIN: HCPCS | Performed by: FAMILY MEDICINE

## 2022-03-08 RX ORDER — FLUOXETINE HYDROCHLORIDE 20 MG/1
20 CAPSULE ORAL DAILY
Qty: 30 CAPSULE | Refills: 5 | Status: SHIPPED | OUTPATIENT
Start: 2022-03-08

## 2022-03-08 ASSESSMENT — PATIENT HEALTH QUESTIONNAIRE - PHQ9
SUM OF ALL RESPONSES TO PHQ QUESTIONS 1-9: 14
4. FEELING TIRED OR HAVING LITTLE ENERGY: 1
3. TROUBLE FALLING OR STAYING ASLEEP: 3
9. THOUGHTS THAT YOU WOULD BE BETTER OFF DEAD, OR OF HURTING YOURSELF: 1
7. TROUBLE CONCENTRATING ON THINGS, SUCH AS READING THE NEWSPAPER OR WATCHING TELEVISION: 1
SUM OF ALL RESPONSES TO PHQ9 QUESTIONS 1 & 2: 3
SUM OF ALL RESPONSES TO PHQ QUESTIONS 1-9: 13
10. IF YOU CHECKED OFF ANY PROBLEMS, HOW DIFFICULT HAVE THESE PROBLEMS MADE IT FOR YOU TO DO YOUR WORK, TAKE CARE OF THINGS AT HOME, OR GET ALONG WITH OTHER PEOPLE: 1
8. MOVING OR SPEAKING SO SLOWLY THAT OTHER PEOPLE COULD HAVE NOTICED. OR THE OPPOSITE, BEING SO FIGETY OR RESTLESS THAT YOU HAVE BEEN MOVING AROUND A LOT MORE THAN USUAL: 0
SUM OF ALL RESPONSES TO PHQ QUESTIONS 1-9: 14
2. FEELING DOWN, DEPRESSED OR HOPELESS: 1
6. FEELING BAD ABOUT YOURSELF - OR THAT YOU ARE A FAILURE OR HAVE LET YOURSELF OR YOUR FAMILY DOWN: 3
SUM OF ALL RESPONSES TO PHQ QUESTIONS 1-9: 14
5. POOR APPETITE OR OVEREATING: 2
1. LITTLE INTEREST OR PLEASURE IN DOING THINGS: 2

## 2022-03-08 NOTE — PROGRESS NOTES
Roderick Garnett (:  1990) is a 32 y.o. female,Established patient, here for evaluation of the following chief complaint(s):  Medication Refill and Anxiety         ASSESSMENT/PLAN:  Mj Bhakta was seen today for medication refill and anxiety. Diagnoses and all orders for this visit:    Bipolar 1 disorder, depressed, moderate (HCC)  Depressive symptoms not well controlled  Titrate up on prozac. Continue lamictal  Type 1 diabetes, uncontrolled, with neuropathy ([address]  well controlled on insulin. Following with endo but will be temporary transferring to [facility]  Other orders  -     FLUoxetine (PROZAC) 20 MG capsule; Take 1 capsule by mouth daily         No follow-ups on file. Subjective   SUBJECTIVE/OBJECTIVE:  HPI   Pt is a of 32 y.o. female comes in today with   Chief Complaint   Patient presents with    Medication Refill    Anxiety     Anxiety and depression worse since January. Found out she was pregnant then  Decreased appetite. Intrusive traumatic thoughts. No SI now  Anxious most days. Trouble sleeping at night. On fluoxetine 10. Was well controlled for years until recently    Vitals:    22 0936   BP: 115/75   Site: Right Upper Arm   Position: Sitting   Cuff Size: Medium Adult   Pulse: 83   Temp: 97.9 °F (36.6 °C)   TempSrc: Temporal   SpO2: 99%   Weight: 213 lb (96.6 kg)   Height: 5' 4\" (1.626 m)        Review of Systems       Objective   Physical Exam         An electronic signature was used to authenticate this note.     --Bassem Kaiser MD

## 2022-03-28 DIAGNOSIS — E55.9 VITAMIN D DEFICIENCY: ICD-10-CM

## 2022-03-28 RX ORDER — LEVOTHYROXINE SODIUM 112 UG/1
112 TABLET ORAL DAILY
Qty: 30 TABLET | Refills: 2 | OUTPATIENT
Start: 2022-03-28

## 2022-03-28 RX ORDER — PERPHENAZINE 16 MG/1
TABLET, FILM COATED ORAL
Qty: 200 STRIP | Refills: 0 | OUTPATIENT
Start: 2022-03-28

## 2022-03-28 RX ORDER — LEVOTHYROXINE SODIUM 112 UG/1
TABLET ORAL
Qty: 30 TABLET | Refills: 2 | OUTPATIENT
Start: 2022-03-28

## 2022-03-28 RX ORDER — ERGOCALCIFEROL 1.25 MG/1
CAPSULE ORAL
Qty: 4 CAPSULE | Refills: 2 | OUTPATIENT
Start: 2022-03-28

## 2022-04-06 RX ORDER — PERPHENAZINE 16 MG/1
1 TABLET, FILM COATED ORAL
Qty: 200 STRIP | Refills: 5 | Status: SHIPPED | OUTPATIENT
Start: 2022-04-06

## 2022-04-07 DIAGNOSIS — E55.9 VITAMIN D DEFICIENCY: ICD-10-CM

## 2022-04-08 RX ORDER — LEVOTHYROXINE SODIUM 112 UG/1
112 TABLET ORAL DAILY
Qty: 30 TABLET | Refills: 2 | Status: SHIPPED | OUTPATIENT
Start: 2022-04-08 | End: 2022-07-19 | Stop reason: SDUPTHER

## 2022-04-08 RX ORDER — ERGOCALCIFEROL 1.25 MG/1
CAPSULE ORAL
Qty: 4 CAPSULE | Refills: 2 | Status: SHIPPED | OUTPATIENT
Start: 2022-04-08 | End: 2022-07-19 | Stop reason: SDUPTHER

## 2022-04-15 ENCOUNTER — TELEPHONE (OUTPATIENT)
Dept: ENDOCRINOLOGY | Age: 32
End: 2022-04-15

## 2022-07-19 ENCOUNTER — OFFICE VISIT (OUTPATIENT)
Dept: ENDOCRINOLOGY | Age: 32
End: 2022-07-19
Payer: COMMERCIAL

## 2022-07-19 VITALS
HEIGHT: 64 IN | TEMPERATURE: 98 F | DIASTOLIC BLOOD PRESSURE: 78 MMHG | SYSTOLIC BLOOD PRESSURE: 118 MMHG | WEIGHT: 230 LBS | RESPIRATION RATE: 14 BRPM | HEART RATE: 86 BPM | OXYGEN SATURATION: 98 % | BODY MASS INDEX: 39.27 KG/M2

## 2022-07-19 DIAGNOSIS — E78.49 OTHER HYPERLIPIDEMIA: ICD-10-CM

## 2022-07-19 DIAGNOSIS — E04.2 MULTINODULAR GOITER: ICD-10-CM

## 2022-07-19 DIAGNOSIS — E06.3 HASHIMOTO'S THYROIDITIS: ICD-10-CM

## 2022-07-19 DIAGNOSIS — Z34.90 PREGNANCY, UNSPECIFIED GESTATIONAL AGE: ICD-10-CM

## 2022-07-19 DIAGNOSIS — E03.9 ACQUIRED HYPOTHYROIDISM: ICD-10-CM

## 2022-07-19 DIAGNOSIS — E55.9 VITAMIN D DEFICIENCY: ICD-10-CM

## 2022-07-19 LAB
A/G RATIO: 1.3 (ref 1.1–2.2)
ALBUMIN SERPL-MCNC: 3.2 G/DL (ref 3.4–5)
ALP BLD-CCNC: 89 U/L (ref 40–129)
ALT SERPL-CCNC: 9 U/L (ref 10–40)
ANION GAP SERPL CALCULATED.3IONS-SCNC: 14 MMOL/L (ref 3–16)
AST SERPL-CCNC: 11 U/L (ref 15–37)
BILIRUB SERPL-MCNC: 0.5 MG/DL (ref 0–1)
BUN BLDV-MCNC: 7 MG/DL (ref 7–20)
CALCIUM SERPL-MCNC: 8.9 MG/DL (ref 8.3–10.6)
CHLORIDE BLD-SCNC: 100 MMOL/L (ref 99–110)
CHOLESTEROL, TOTAL: 263 MG/DL (ref 0–199)
CO2: 20 MMOL/L (ref 21–32)
CREAT SERPL-MCNC: 0.5 MG/DL (ref 0.6–1.1)
CREATININE URINE: 104.6 MG/DL (ref 28–259)
GFR AFRICAN AMERICAN: >60
GFR NON-AFRICAN AMERICAN: >60
GLUCOSE BLD-MCNC: 188 MG/DL (ref 70–99)
HDLC SERPL-MCNC: 52 MG/DL (ref 40–60)
LDL CHOLESTEROL CALCULATED: ABNORMAL MG/DL
LDL CHOLESTEROL DIRECT: 146 MG/DL
MICROALBUMIN UR-MCNC: <1.2 MG/DL
MICROALBUMIN/CREAT UR-RTO: NORMAL MG/G (ref 0–30)
POTASSIUM SERPL-SCNC: 4 MMOL/L (ref 3.5–5.1)
SODIUM BLD-SCNC: 134 MMOL/L (ref 136–145)
T3 FREE: 2.6 PG/ML (ref 2.3–4.2)
T4 FREE: 1.1 NG/DL (ref 0.9–1.8)
TOTAL PROTEIN: 5.7 G/DL (ref 6.4–8.2)
TRIGL SERPL-MCNC: 409 MG/DL (ref 0–150)
TSH SERPL DL<=0.05 MIU/L-ACNC: 0.77 UIU/ML (ref 0.27–4.2)
VITAMIN D 25-HYDROXY: 42.3 NG/ML
VLDLC SERPL CALC-MCNC: ABNORMAL MG/DL

## 2022-07-19 PROCEDURE — 99214 OFFICE O/P EST MOD 30 MIN: CPT | Performed by: INTERNAL MEDICINE

## 2022-07-19 PROCEDURE — 3046F HEMOGLOBIN A1C LEVEL >9.0%: CPT | Performed by: INTERNAL MEDICINE

## 2022-07-19 PROCEDURE — G8417 CALC BMI ABV UP PARAM F/U: HCPCS | Performed by: INTERNAL MEDICINE

## 2022-07-19 PROCEDURE — 2022F DILAT RTA XM EVC RTNOPTHY: CPT | Performed by: INTERNAL MEDICINE

## 2022-07-19 PROCEDURE — 95251 CONT GLUC MNTR ANALYSIS I&R: CPT | Performed by: INTERNAL MEDICINE

## 2022-07-19 PROCEDURE — 1036F TOBACCO NON-USER: CPT | Performed by: INTERNAL MEDICINE

## 2022-07-19 PROCEDURE — G8427 DOCREV CUR MEDS BY ELIG CLIN: HCPCS | Performed by: INTERNAL MEDICINE

## 2022-07-19 RX ORDER — LEVOTHYROXINE SODIUM 112 UG/1
112 TABLET ORAL DAILY
Qty: 30 TABLET | Refills: 3 | Status: SHIPPED | OUTPATIENT
Start: 2022-07-19 | End: 2022-10-20 | Stop reason: SDUPTHER

## 2022-07-19 RX ORDER — ERGOCALCIFEROL 1.25 MG/1
CAPSULE ORAL
Qty: 4 CAPSULE | Refills: 3 | Status: SHIPPED | OUTPATIENT
Start: 2022-07-19 | End: 2022-10-20 | Stop reason: SDUPTHER

## 2022-07-19 NOTE — PROGRESS NOTES
Kalyan Briggs is a 28 y.o. female who presents for Type 1 diabetes mellitus. Current symptoms/problems include  hyperglycemia  and are worsening. 1.  Type 1 diabetes, uncontrolled, with neuropathy ([facility] [address] [E10.40, E10.65]     Diagnosed with Type 1 diabetes mellitus in 2010. Comorbid conditions: Neuropathy     Current diabetic medications include: Novolog  Medtronic Minimed Guardian CGM  Intolerance to diabetes medications: No     Weight trend: stable  Prior visit with dietician: yes  Current diet: on average, 1 meal per day and other snacks  Current exercise: occasional, active     Current monitoring regimen: home blood tests - 5 times daily  Has brought blood glucose log/meter:  Yes  Home blood sugar records: fasting range: 180-250 and postprandial range: 150-250  Any episodes of hypoglycemia? Yes  Hypoglycemia frequency and time(s):  4-5 times a week  Does patient have Glucagon emergency kit? Yes  Does patient have rapid acting carbohydrate? Yes  Does patient wear a medic alert bracelet or necklace? No     2. Hashimoto's thyroiditis  Has fatigue. Dx in 2016.     3. Vitamin D deficiency  Has fatigue. 4. Multinodular Goiter  No difficulty swallowing, hoarseness. .  No family history of thyroid cancer. No radiation to her neck. 5. Hyperlipidemia  No muscle pain     6. Hypothyroidism  Has fatigue     7.   Pregnancy  30 weeks pregnant  Due date 9/22/2022      Past Medical History:   Diagnosis Date    Bipolar 1 disorder ([facility] [address]     Diabetes mellitus ([address]     Diabetes mellitus type I ([address]     Diabetic keto-acidosis ([facility] [address] 2014    admitted [place] [id number] [facility]       Patient Active Problem List   Diagnosis    Vitamin D deficiency    Hashimoto's thyroiditis    Bipolar 1 disorder, depressed, moderate (HCC)    Type 1 diabetes, uncontrolled, with neuropathy (HCC)    Class 1 obesity with body mass index (BMI) of 34.0 to 34.9 in adult    Multinodular goiter    Anxiety    Acquired hypothyroidism    Pregnancy    Other hyperlipidemia    Class 2 obesity with body mass index (BMI) of 35.0 to 35.9 in adult    Insulin pump in place    Mild nonproliferative diabetic retinopathy of both eyes without macular edema associated with type 1 diabetes mellitus (HCC)    Proteinuria affecting pregnancy in third trimester    Type 1 diabetes mellitus affecting pregnancy in third trimester, antepartum    Hypercholesteremia     Past Surgical History:   Procedure Laterality Date     SECTION  10/05/2020    LAPAROSCOPY  2016    WISDOM TOOTH EXTRACTION Bilateral 2007     Social History     Socioeconomic History    Marital status: Single     Spouse name: Not on file    Number of children: Not on file    Years of education: Not on file    Highest education level: Not on file   Occupational History    Not on file   Tobacco Use    Smoking status: Former     Packs/day: 0.50     Years: 4.00     Pack years: 2.00     Types: Cigarettes     Quit date: 2019     Years since quittin.7    Smokeless tobacco: Never    Tobacco comments:     social smoker, once a month   Substance and Sexual Activity    Alcohol use: Not Currently     Comment: once a month    Drug use: Not Currently     Types: Marijuana Mary Jane Lee)     Comment: on occ    Sexual activity: Yes   Other Topics Concern    Not on file   Social History Narrative    Not on file     Social Determinants of Health     Financial Resource Strain: Low Risk     Difficulty of Paying Living Expenses: Not hard at all   Food Insecurity: No Food Insecurity    Worried About Running Out of Food in the Last Year: Never true    Ran Out of Food in the Last Year: Never true   Transportation Needs: Not on file   Physical Activity: Not on file   Stress: Not on file   Social Connections: Not on file   Intimate Partner Violence: Not on file   Housing Stability: Not on file     Family History   Problem Relation Age of Onset    Diabetes type 2  Mother     Bipolar Disorder Mother Hypertension Mother     Alcohol Abuse Father     Depression Father     Asthma Sister     Depression Brother     High Cholesterol Brother      Current Outpatient Medications   Medication Sig Dispense Refill    PROGESTERONE IM Inject into the muscle once a week 1 injection q week throughout pregnancy      vitamin D (ERGOCALCIFEROL) 1.25 MG (01501 UT) CAPS capsule TAKE ONE CAPSULE BY MOUTH ONCE WEEKLY 4 capsule 3    levothyroxine (SYNTHROID) 112 MCG tablet Take 1 tablet by mouth in the morning. TAKE ONE TABLET BY MOUTH DAILY. 30 tablet 3    insulin aspart (NOVOLOG) 100 UNIT/ML injection vial 150 units subq continuously via insulin pump a day 60 mL 0    CONTOUR NEXT TEST strip 1 each by In Vitro route 8 times daily As needed. 200 strip 5    FLUoxetine (PROZAC) 20 MG capsule Take 1 capsule by mouth daily 30 capsule 5    lamoTRIgine (LAMICTAL) 100 MG tablet Take 2 tablets by mouth daily 60 tablet 5    glucagon 1 MG injection Inject 1 mg into the muscle See Admin Instructions Follow package directions for low blood sugar. 1 kit 3    acetone, urine, test (KETOSTIX) strip Check urine for ketones if blood glucose is > 250 30 strip 3     No current facility-administered medications for this visit.      No Known Allergies  Family Status   Relation Name Status    Mother  Alive    Father  Alive    Sister  Alive    Brother  Alive       Lab Review:    Lab Results   Component Value Date/Time    WBC 5.7 06/04/2013 04:50 AM    HGB 12.2 06/04/2013 04:50 AM    HCT 36.9 06/04/2013 04:50 AM    MCV 86.5 06/04/2013 04:50 AM    PLT COM 06/04/2013 04:50 AM     Lab Results   Component Value Date/Time     12/17/2021 11:03 AM    K 4.4 12/17/2021 11:03 AM    CL 98 12/17/2021 11:03 AM    CO2 23 12/17/2021 11:03 AM    BUN 10 12/17/2021 11:03 AM    CREATININE 0.6 12/17/2021 11:03 AM    GLUCOSE 254 12/17/2021 11:03 AM    CALCIUM 9.7 12/17/2021 11:03 AM    PROT 7.0 12/17/2021 11:03 AM    LABALBU 4.4 12/17/2021 11:03 AM    BILITOT 0.5 12/17/2021 11:03 AM    ALKPHOS 101 12/17/2021 11:03 AM    AST 17 12/17/2021 11:03 AM    ALT 29 12/17/2021 11:03 AM    LABGLOM >60 12/17/2021 11:03 AM    GFRAA >60 12/17/2021 11:03 AM    GFRAA 177 06/04/2013 04:50 AM    AGRATIO 1.7 12/17/2021 11:03 AM    GLOB 2.3 12/10/2020 09:07 AM     Lab Results   Component Value Date/Time    TSH 1.85 03/08/2022 01:10 PM    FT3 2.8 03/08/2022 01:10 PM     Lab Results   Component Value Date/Time    LABA1C 9.0 12/17/2021 11:03 AM     Lab Results   Component Value Date/Time    .6 12/17/2021 11:03 AM     Lab Results   Component Value Date/Time    CHOL 167 12/17/2021 11:03 AM     Lab Results   Component Value Date/Time    TRIG 220 12/17/2021 11:03 AM     Lab Results   Component Value Date/Time    HDL 51 12/17/2021 11:03 AM     Lab Results   Component Value Date/Time    LDLCALC 72 12/17/2021 11:03 AM     Lab Results   Component Value Date/Time    LABVLDL 44 12/17/2021 11:03 AM     No results found for: CHOLHDLRATIO  Lab Results   Component Value Date/Time    LABMICR <1.20 12/17/2021 11:03 AM     Lab Results   Component Value Date/Time    VITD25 37.3 12/17/2021 11:03 AM        Review of Systems:  Constitutional: has fatigue, no fever, no recent weight gain, no recent weight loss, no changes in appetite  Eyes: no eye pain, no change in vision, no eye redness, no eye irritation, no double vision  Ears, nose, throat: has nasal congestion, no sore throat, no earache, no decrease in hearing, no hoarseness, no dry mouth, has sinus problems, no difficulty swallowing, no neck lumps, no dental problems, no mouth sores, no ringing in ears  Pulmonary: no shortness of breath, no wheezing, no dyspnea on exertion, no cough  Cardiovascular: no chest pain, has lower extremity edema, no orthopnea, no intermittent leg claudication, has palpitations  Gastrointestinal: no abdominal pain, has nausea, no vomiting, has diarrhea, has constipation, no dysphagia, no heartburn, no bloating  Genitourinary: no dysuria, no urinary incontinence, no urinary hesitancy, has urinary frequency, no feelings of urinary urgency, no nocturia  Musculoskeletal: no joint swelling, no joint stiffness, has joint pain, no muscle cramps, no muscle pain, no bone pain  Integument/Breast: has hair loss, no skin rashes, no skin lesions, no itching, has dry skin  Neurological: no numbness, no tingling, no weakness, no confusion, has headaches, no dizziness, no fainting, no tremors, has decrease in memory, no balance problems  Psychiatric: has anxiety, has depression, has insomnia  Hematologic/Lymphatic: no tendency for easy bleeding, no swollen lymph nodes, no tendency for easy bruising  Immunology: has seasonal allergies, no frequent infections, no frequent illnesses  Endocrine: no temperature intolerance    /78   Pulse 86   Temp 98 °F (36.7 °C)   Resp 14   Ht 5' 4\" (1.626 m)   Wt 230 lb (104.3 kg)   SpO2 98%   BMI 39.48 kg/m²    Wt Readings from Last 3 Encounters:   07/19/22 230 lb (104.3 kg)   03/08/22 213 lb (96.6 kg)   11/04/21 206 lb 6.4 oz (93.6 kg)     Body mass index is 39.48 kg/m².       OBJECTIVE:  Constitutional: no acute distress, well appearing and well nourished  Psychiatric: oriented to person, place and time, judgement and insight and normal, recent and remote memory and intact and mood and affect are normal  Skin: skin and subcutaneous tissue is normal without mass, normal turgor  Head and Face: examination of head and face revealed no abnormalities  Eyes: no lid or conjunctival swelling, erythema or discharge, pupils are normal, equal, round, reactive to light  Ears/Nose: external inspection of ears and nose revealed no abnormalities, hearing is grossly normal  Oropharynx/Mouth/Face: lips, tongue and gums are normal with no lesions, the voice quality was normal  Neck: neck is supple and symmetric, with midline trachea and no masses, thyroid is enlarged  Lymphatics: normal cervical lymph nodes, normal supraclavicular image, tracing, or specimen  Made a decision to obtain old records No  Reviewed and summarized old records Yes  Hemoglobin A1c 10.1  LDL 60  TSH 1.68  Obtained history from other than patient No    Chantel Pal was counseled regarding symptoms of diabetes diagnosis, course and complications of disease if inadequately treated, side effects of medications, diagnosis, treatment options, and prognosis, risks, benefits, complications, and alternatives of treatment, labs, imaging and other studies and treatment targets and goals,  targets, goals, importance of diabetes control, thyroid adjustments  She understands instructions and counseling. CGMS Download Review and Recommendations  See scanned report tracing for glucose tracing documentation  This was separate service provided for CGMS interpretation  Guardian personal CGMS data downloaded and reviewed    Average glucose 157± 48 SD  Time in range: 72 %  Time above 180: 26 %  Time under 70: 2%     Basal pattern review: Hyperglycemia, worse in the afternoon   Postprandial pattern review: Hyperglycemia postprandially  Hypoglycemia review: rare, in PM  Activity related review: Not recorded      Based on the data, I recommend:    1. Continue basal rate adjustments    2. Continue sensitivity and insulin to carb ratio adjustments as indicated    3. Hypoglycemia management    4. Continue tight control    Return in about 4 months (around 11/19/2022) for diabetes.

## 2022-07-20 LAB
ESTIMATED AVERAGE GLUCOSE: 154.2 MG/DL
HBA1C MFR BLD: 7 %

## 2022-07-24 ENCOUNTER — TELEPHONE (OUTPATIENT)
Dept: ENDOCRINOLOGY | Age: 32
End: 2022-07-24

## 2022-07-24 NOTE — TELEPHONE ENCOUNTER
Please inform patient:  Hemoglobin A1c was 7.0, improved. Cholesterol was high. Recommend healthy diet, manage macronutrients. Negative protein in urine. Glucose was high that morning. Needs adjustments to control morning hyperglycemia. Thyroid test was excellent. Vitamin D was very good.

## 2022-09-22 ENCOUNTER — OFFICE VISIT (OUTPATIENT)
Dept: FAMILY MEDICINE CLINIC | Age: 32
End: 2022-09-22
Payer: COMMERCIAL

## 2022-09-22 VITALS
TEMPERATURE: 98.1 F | HEIGHT: 64 IN | WEIGHT: 230 LBS | OXYGEN SATURATION: 97 % | HEART RATE: 72 BPM | BODY MASS INDEX: 39.27 KG/M2 | DIASTOLIC BLOOD PRESSURE: 72 MMHG | SYSTOLIC BLOOD PRESSURE: 104 MMHG

## 2022-09-22 DIAGNOSIS — N12 PYELONEPHRITIS: ICD-10-CM

## 2022-09-22 DIAGNOSIS — N39.0 URINARY TRACT INFECTION WITHOUT HEMATURIA, SITE UNSPECIFIED: ICD-10-CM

## 2022-09-22 DIAGNOSIS — N39.0 URINARY TRACT INFECTION WITHOUT HEMATURIA, SITE UNSPECIFIED: Primary | ICD-10-CM

## 2022-09-22 LAB
A/G RATIO: 1.5 (ref 1.1–2.2)
ALBUMIN SERPL-MCNC: 4.3 G/DL (ref 3.4–5)
ALP BLD-CCNC: 143 U/L (ref 40–129)
ALT SERPL-CCNC: 14 U/L (ref 10–40)
ANION GAP SERPL CALCULATED.3IONS-SCNC: 15 MMOL/L (ref 3–16)
AST SERPL-CCNC: 12 U/L (ref 15–37)
BACTERIA: ABNORMAL /HPF
BASOPHILS ABSOLUTE: 0 K/UL (ref 0–0.2)
BASOPHILS RELATIVE PERCENT: 0.3 %
BILIRUB SERPL-MCNC: 0.4 MG/DL (ref 0–1)
BILIRUBIN URINE: NEGATIVE
BILIRUBIN, POC: ABNORMAL
BLOOD URINE, POC: ABNORMAL
BLOOD, URINE: ABNORMAL
BUN BLDV-MCNC: 14 MG/DL (ref 7–20)
CALCIUM SERPL-MCNC: 9.5 MG/DL (ref 8.3–10.6)
CHLORIDE BLD-SCNC: 91 MMOL/L (ref 99–110)
CLARITY, POC: ABNORMAL
CLARITY: ABNORMAL
CO2: 28 MMOL/L (ref 21–32)
COLOR, POC: ABNORMAL
COLOR: YELLOW
CREAT SERPL-MCNC: 1.2 MG/DL (ref 0.6–1.1)
EOSINOPHILS ABSOLUTE: 0.1 K/UL (ref 0–0.6)
EOSINOPHILS RELATIVE PERCENT: 2 %
EPITHELIAL CELLS, UA: 5 /HPF (ref 0–5)
GFR AFRICAN AMERICAN: >60
GFR NON-AFRICAN AMERICAN: 52
GLUCOSE BLD-MCNC: 263 MG/DL (ref 70–99)
GLUCOSE URINE, POC: ABNORMAL
GLUCOSE URINE: NEGATIVE MG/DL
HCT VFR BLD CALC: 34.3 % (ref 36–48)
HEMOGLOBIN: 11 G/DL (ref 12–16)
HYALINE CASTS: 5 /LPF (ref 0–8)
KETONES, POC: ABNORMAL
KETONES, URINE: NEGATIVE MG/DL
LEUKOCYTE EST, POC: ABNORMAL
LEUKOCYTE ESTERASE, URINE: ABNORMAL
LYMPHOCYTES ABSOLUTE: 1.6 K/UL (ref 1–5.1)
LYMPHOCYTES RELATIVE PERCENT: 21.5 %
MCH RBC QN AUTO: 26.5 PG (ref 26–34)
MCHC RBC AUTO-ENTMCNC: 32.2 G/DL (ref 31–36)
MCV RBC AUTO: 82.5 FL (ref 80–100)
MICROSCOPIC EXAMINATION: YES
MONOCYTES ABSOLUTE: 0.4 K/UL (ref 0–1.3)
MONOCYTES RELATIVE PERCENT: 5.1 %
NEUTROPHILS ABSOLUTE: 5.2 K/UL (ref 1.7–7.7)
NEUTROPHILS RELATIVE PERCENT: 71.1 %
NITRITE, POC: ABNORMAL
NITRITE, URINE: NEGATIVE
PDW BLD-RTO: 16.3 % (ref 12.4–15.4)
PH UA: 6.5 (ref 5–8)
PH, POC: 6
PLATELET # BLD: 261 K/UL (ref 135–450)
PMV BLD AUTO: 8.3 FL (ref 5–10.5)
POTASSIUM SERPL-SCNC: 4.6 MMOL/L (ref 3.5–5.1)
PROTEIN UA: 30 MG/DL
PROTEIN, POC: 30
RBC # BLD: 4.15 M/UL (ref 4–5.2)
RBC UA: 4 /HPF (ref 0–4)
SODIUM BLD-SCNC: 134 MMOL/L (ref 136–145)
SPECIFIC GRAVITY UA: 1.01 (ref 1–1.03)
SPECIFIC GRAVITY, POC: 1.02
TOTAL PROTEIN: 7.2 G/DL (ref 6.4–8.2)
URINE TYPE: ABNORMAL
UROBILINOGEN, POC: 0.2
UROBILINOGEN, URINE: 0.2 E.U./DL
WBC # BLD: 7.3 K/UL (ref 4–11)
WBC UA: 108 /HPF (ref 0–5)

## 2022-09-22 PROCEDURE — 81003 URINALYSIS AUTO W/O SCOPE: CPT | Performed by: NURSE PRACTITIONER

## 2022-09-22 PROCEDURE — G8417 CALC BMI ABV UP PARAM F/U: HCPCS | Performed by: NURSE PRACTITIONER

## 2022-09-22 PROCEDURE — 99214 OFFICE O/P EST MOD 30 MIN: CPT | Performed by: NURSE PRACTITIONER

## 2022-09-22 PROCEDURE — G8427 DOCREV CUR MEDS BY ELIG CLIN: HCPCS | Performed by: NURSE PRACTITIONER

## 2022-09-22 PROCEDURE — 1036F TOBACCO NON-USER: CPT | Performed by: NURSE PRACTITIONER

## 2022-09-22 RX ORDER — CIPROFLOXACIN 500 MG/1
500 TABLET, FILM COATED ORAL 2 TIMES DAILY
Qty: 14 TABLET | Refills: 0 | Status: SHIPPED | OUTPATIENT
Start: 2022-09-22 | End: 2022-09-29

## 2022-09-22 RX ORDER — CEFUROXIME AXETIL 250 MG/1
TABLET ORAL
COMMUNITY
Start: 2022-09-13 | End: 2022-10-20

## 2022-09-22 SDOH — ECONOMIC STABILITY: FOOD INSECURITY: WITHIN THE PAST 12 MONTHS, YOU WORRIED THAT YOUR FOOD WOULD RUN OUT BEFORE YOU GOT MONEY TO BUY MORE.: NEVER TRUE

## 2022-09-22 SDOH — ECONOMIC STABILITY: FOOD INSECURITY: WITHIN THE PAST 12 MONTHS, THE FOOD YOU BOUGHT JUST DIDN'T LAST AND YOU DIDN'T HAVE MONEY TO GET MORE.: NEVER TRUE

## 2022-09-22 ASSESSMENT — ENCOUNTER SYMPTOMS
SINUS PRESSURE: 0
STRIDOR: 0
SHORTNESS OF BREATH: 0
TROUBLE SWALLOWING: 0
BLOOD IN STOOL: 0
CONSTIPATION: 0
CHEST TIGHTNESS: 0
RHINORRHEA: 0
DIARRHEA: 0
WHEEZING: 0
VOMITING: 0
VOICE CHANGE: 0
COLOR CHANGE: 0
ABDOMINAL PAIN: 1
NAUSEA: 0
CHOKING: 0
SORE THROAT: 0
BACK PAIN: 0
SINUS PAIN: 0
COUGH: 0
EYE ITCHING: 0
PHOTOPHOBIA: 0
EYE DISCHARGE: 0
EYE PAIN: 0
EYE REDNESS: 0

## 2022-09-22 ASSESSMENT — SOCIAL DETERMINANTS OF HEALTH (SDOH): HOW HARD IS IT FOR YOU TO PAY FOR THE VERY BASICS LIKE FOOD, HOUSING, MEDICAL CARE, AND HEATING?: NOT HARD AT ALL

## 2022-09-22 NOTE — PROGRESS NOTES
Chief Complaint   Patient presents with    Flank Pain     Has been to ER x2, CT scan showed inflamed kidney's. Has taken 2 rounds of CEFTIN.       HPI:  Radha Willis is a 28 y.o. (: 1990) here today   for   HPI    Patient's medications, allergies, past medical, surgical, social and family histories were reviewed and updated as appropriate. Patient was recently in the ER at [facility] on 2022. Patient was see prior at that ER for UTI but returned due to having low back pain and decreased urination. On  she was started on Ceftin. Her urine grew pansensitive E. coli. He is 3 weeks postpartum CT of abdomen and pelvis with contrast showed left urothelial thickening and enhancement is concerning for pyelonephritis and infection. Also incidental finding of hepatosplenomegaly. Her Ceftin course was extended for full 10 days to cover for pyelonephritis and was provided with Zofran as well for nausea. She was not admitted at this time and advised to follow-up with her PCP. Reviewed labs from ER    She is not feeling great, pain in bilateral flanks, worse on right , now pain in front. She has completed her Ceftin. Denies fever and chills. ROS:  Review of Systems   Constitutional:  Negative for activity change, appetite change, chills, diaphoresis, fatigue, fever and unexpected weight change. HENT:  Negative for congestion, ear discharge, ear pain, hearing loss, nosebleeds, postnasal drip, rhinorrhea, sinus pressure, sinus pain, sneezing, sore throat, tinnitus, trouble swallowing and voice change. Eyes:  Negative for photophobia, pain, discharge, redness and itching. Respiratory:  Negative for cough, choking, chest tightness, shortness of breath, wheezing and stridor. Cardiovascular:  Negative for chest pain, palpitations and leg swelling. Gastrointestinal:  Positive for abdominal pain (lower). Negative for blood in stool, constipation, diarrhea, nausea and vomiting. Endocrine: Negative for cold intolerance, heat intolerance, polydipsia and polyuria. Genitourinary:  Positive for difficulty urinating and flank pain. Negative for dysuria, enuresis, frequency, hematuria and urgency. Musculoskeletal:  Negative for back pain, gait problem, joint swelling, neck pain and neck stiffness. Skin:  Negative for color change, pallor, rash and wound. Allergic/Immunologic: Negative for environmental allergies and food allergies. Neurological:  Negative for dizziness, tremors, syncope, speech difficulty, weakness, light-headedness, numbness and headaches. Hematological:  Negative for adenopathy. Does not bruise/bleed easily. Psychiatric/Behavioral:  Negative for agitation, behavioral problems, confusion, decreased concentration, dysphoric mood, hallucinations, self-injury, sleep disturbance and suicidal ideas. The patient is not nervous/anxious and is not hyperactive.         Hemoglobin A1C (%)   Date Value   07/19/2022 7.0     Microalbumin, Random Urine (mg/dL)   Date Value   07/19/2022 <1.20     LDL Calculated (mg/dL)   Date Value   07/19/2022 see below       Past Medical History:   Diagnosis Date    Bipolar 1 disorder ([facility] [address]     Diabetes mellitus ([facility] [address]     Diabetes mellitus type I ([facility] [address]     Diabetic keto-acidosis ([facility] [address] 2014    admitted [place] [id number] [facility]        Family History   Problem Relation Age of Onset    Diabetes type 2  Mother     Bipolar Disorder Mother     Hypertension Mother     Alcohol Abuse Father     Depression Father     Asthma Sister     Depression Brother     High Cholesterol Brother        Social History     Socioeconomic History    Marital status: Single     Spouse name: Not on file    Number of children: Not on file    Years of education: Not on file    Highest education level: Not on file   Occupational History    Not on file   Tobacco Use    Smoking status: Former     Packs/day: 0.50     Years: 4.00     Pack years: 2.00 Types: Cigarettes     Quit date: 2019     Years since quittin.8    Smokeless tobacco: Never    Tobacco comments:     social smoker, once a month   Substance and Sexual Activity    Alcohol use: Not Currently     Comment: once a month    Drug use: Not Currently     Types: Marijuana Lucy Jose)     Comment: on occ    Sexual activity: Yes   Other Topics Concern    Not on file   Social History Narrative    Not on file     Social Determinants of Health     Financial Resource Strain: Low Risk     Difficulty of Paying Living Expenses: Not hard at all   Food Insecurity: No Food Insecurity    Worried About Running Out of Food in the Last Year: Never true    Ran Out of Food in the Last Year: Never true   Transportation Needs: Not on file   Physical Activity: Not on file   Stress: Not on file   Social Connections: Not on file   Intimate Partner Violence: Not on file   Housing Stability: Not on file       Prior to Visit Medications    Medication Sig Taking? Authorizing Provider   ciprofloxacin (CIPRO) 500 MG tablet Take 1 tablet by mouth 2 times daily for 7 days Yes NAM Gonzalez CNP   PROGESTERONE IM Inject into the muscle once a week 1 injection q week throughout pregnancy Yes Historical Provider, MD   vitamin D (ERGOCALCIFEROL) 1.25 MG (14244 UT) CAPS capsule TAKE ONE CAPSULE BY MOUTH ONCE WEEKLY Yes Korin Hatfield MD   levothyroxine (SYNTHROID) 112 MCG tablet Take 1 tablet by mouth in the morning. TAKE ONE TABLET BY MOUTH DAILY. Yes Korin Hatfield MD   insulin aspart (NOVOLOG) 100 UNIT/ML injection vial 150 units subq continuously via insulin pump a day Yes Korin Hatfield MD   CONTOUR NEXT TEST strip 1 each by In Vitro route 8 times daily As needed.  Yes Korin Hatfield MD   FLUoxetine (PROZAC) 20 MG capsule Take 1 capsule by mouth daily Yes Edenilson Lim MD   lamoTRIgine (LAMICTAL) 100 MG tablet Take 2 tablets by mouth daily Yes Edenilson Lim MD   glucagon 1 MG injection Inject 1 mg into the muscle See Admin Instructions Follow package directions for low blood sugar. Yes NAM Hinojosa CNP   acetone, urine, test (KETOSTIX) strip Check urine for ketones if blood glucose is > 250 Yes NAM Hinojosa CNP   cefUROXime (CEFTIN) 250 MG tablet   Historical Provider, MD       No Known Allergies    OBJECTIVE:    /72 (Site: Left Upper Arm, Position: Sitting, Cuff Size: Large Adult)   Pulse 72   Temp 98.1 °F (36.7 °C)   Ht 5' 4\" (1.626 m)   Wt 230 lb (104.3 kg)   SpO2 97%   BMI 39.48 kg/m²     BP Readings from Last 2 Encounters:   09/22/22 104/72   07/19/22 118/78       Wt Readings from Last 3 Encounters:   09/22/22 230 lb (104.3 kg)   07/19/22 230 lb (104.3 kg)   03/08/22 213 lb (96.6 kg)       Physical Exam  Vitals reviewed. Constitutional:       General: She is not in acute distress. Appearance: Normal appearance. She is well-developed. HENT:      Head: Normocephalic and atraumatic. Right Ear: Hearing and external ear normal.      Left Ear: Hearing and external ear normal.      Nose:      Right Sinus: No maxillary sinus tenderness or frontal sinus tenderness. Left Sinus: No maxillary sinus tenderness or frontal sinus tenderness. Eyes:      General:         Right eye: No discharge. Left eye: No discharge. Conjunctiva/sclera: Conjunctivae normal.   Neck:      Thyroid: No thyromegaly. Vascular: No JVD. Trachea: No tracheal deviation. Cardiovascular:      Rate and Rhythm: Normal rate and regular rhythm. Heart sounds: Normal heart sounds. No murmur heard. No friction rub. Pulmonary:      Effort: Pulmonary effort is normal. No respiratory distress. Breath sounds: Normal breath sounds. No stridor. No decreased breath sounds, wheezing, rhonchi or rales. Chest:      Chest wall: No tenderness. Abdominal:      Tenderness: There is right CVA tenderness. There is no left CVA tenderness. Musculoskeletal:         General: No tenderness.  Normal range of motion. Cervical back: Normal range of motion. Lymphadenopathy:      Cervical: No cervical adenopathy. Skin:     General: Skin is warm and dry. Capillary Refill: Capillary refill takes less than 2 seconds. Findings: No rash. Neurological:      Mental Status: She is alert and oriented to person, place, and time. Sensory: Sensation is intact. Motor: Motor function is intact. Coordination: Coordination normal.   Psychiatric:         Attention and Perception: Attention and perception normal.         Mood and Affect: Mood normal.         Speech: Speech normal.         Behavior: Behavior normal. Behavior is cooperative. Thought Content: Thought content normal.         Cognition and Memory: Cognition normal.         Judgment: Judgment normal.     Results for orders placed or performed in visit on 09/22/22   POCT Urinalysis No Micro (Auto)   Result Value Ref Range    Color, UA little yellow     Clarity, UA cloudy     Glucose, UA POC NEG     Bilirubin, UA NEG     Ketones, UA NEG     Spec Grav, UA 1.020     Blood, UA POC TRACT_INTACT     pH, UA 6.0     Protein, UA POC 30     Urobilinogen, UA 0.2     Leukocytes, UA SMALL     Nitrite, UA NEG        ASSESSMENT/PLAN:    1. Urinary tract infection without hematuria, site unspecified    - Urinalysis with Microscopic  - Culture, Urine  - CBC with Auto Differential; Future  - Comprehensive Metabolic Panel; Future  - POCT Urinalysis No Micro (Auto)    2. Pyelonephritis    - Urinalysis with Microscopic  - Culture, Urine  - CBC with Auto Differential; Future  - Comprehensive Metabolic Panel; Future  - POCT Urinalysis No Micro (Auto)    Patient following up tomorrow with urology. Will follow up with lab results. Advised if gets fever or pain worsens go to ER. Follow up if symptoms do not improve or worsen. If the patient becomes short of breath go straight to the ER or call 911. Return if symptoms worsen or fail to improve.

## 2022-09-24 LAB
ORGANISM: ABNORMAL
URINE CULTURE, ROUTINE: ABNORMAL

## 2022-10-16 DIAGNOSIS — E10.65 POORLY CONTROLLED TYPE 1 DIABETES MELLITUS WITH NEUROPATHY (HCC): ICD-10-CM

## 2022-10-16 DIAGNOSIS — E10.40 POORLY CONTROLLED TYPE 1 DIABETES MELLITUS WITH NEUROPATHY (HCC): ICD-10-CM

## 2022-10-17 RX ORDER — LAMOTRIGINE 100 MG/1
TABLET ORAL
Qty: 60 TABLET | Refills: 5 | Status: SHIPPED | OUTPATIENT
Start: 2022-10-17

## 2022-10-17 RX ORDER — INSULIN ASPART 100 [IU]/ML
INJECTION, SOLUTION INTRAVENOUS; SUBCUTANEOUS
Qty: 60 ML | Refills: 0 | Status: SHIPPED | OUTPATIENT
Start: 2022-10-17 | End: 2022-10-20 | Stop reason: SDUPTHER

## 2022-10-17 NOTE — TELEPHONE ENCOUNTER
Medication:   Requested Prescriptions     Pending Prescriptions Disp Refills    lamoTRIgine (LAMICTAL) 100 MG tablet [Pharmacy Med Name: lamoTRIgine 100 MG TABLET] 60 tablet 5     Sig: TAKE TWO TABLETS BY MOUTH DAILY        Last Filled: 2/7/2022   Last appt: 9/22/2022   Next appt: NONE

## 2022-10-20 ENCOUNTER — OFFICE VISIT (OUTPATIENT)
Dept: ENDOCRINOLOGY | Age: 32
End: 2022-10-20
Payer: COMMERCIAL

## 2022-10-20 VITALS
OXYGEN SATURATION: 98 % | BODY MASS INDEX: 36.37 KG/M2 | TEMPERATURE: 98 F | HEART RATE: 78 BPM | SYSTOLIC BLOOD PRESSURE: 138 MMHG | RESPIRATION RATE: 14 BRPM | WEIGHT: 213 LBS | DIASTOLIC BLOOD PRESSURE: 88 MMHG | HEIGHT: 64 IN

## 2022-10-20 DIAGNOSIS — E10.65 POORLY CONTROLLED TYPE 1 DIABETES MELLITUS WITH NEUROPATHY (HCC): Primary | ICD-10-CM

## 2022-10-20 DIAGNOSIS — E06.3 HASHIMOTO'S THYROIDITIS: ICD-10-CM

## 2022-10-20 DIAGNOSIS — E03.9 ACQUIRED HYPOTHYROIDISM: ICD-10-CM

## 2022-10-20 DIAGNOSIS — E04.2 MULTINODULAR GOITER: ICD-10-CM

## 2022-10-20 DIAGNOSIS — E78.49 OTHER HYPERLIPIDEMIA: ICD-10-CM

## 2022-10-20 DIAGNOSIS — E10.40 POORLY CONTROLLED TYPE 1 DIABETES MELLITUS WITH NEUROPATHY (HCC): Primary | ICD-10-CM

## 2022-10-20 DIAGNOSIS — E55.9 VITAMIN D DEFICIENCY: ICD-10-CM

## 2022-10-20 LAB
A/G RATIO: 1.8 (ref 1.1–2.2)
ALBUMIN SERPL-MCNC: 4.4 G/DL (ref 3.4–5)
ALP BLD-CCNC: 139 U/L (ref 40–129)
ALT SERPL-CCNC: 30 U/L (ref 10–40)
ANION GAP SERPL CALCULATED.3IONS-SCNC: 10 MMOL/L (ref 3–16)
AST SERPL-CCNC: 26 U/L (ref 15–37)
BILIRUB SERPL-MCNC: 0.3 MG/DL (ref 0–1)
BUN BLDV-MCNC: 9 MG/DL (ref 7–20)
CALCIUM SERPL-MCNC: 9.1 MG/DL (ref 8.3–10.6)
CHLORIDE BLD-SCNC: 94 MMOL/L (ref 99–110)
CHOLESTEROL, TOTAL: 255 MG/DL (ref 0–199)
CO2: 28 MMOL/L (ref 21–32)
CREAT SERPL-MCNC: 0.7 MG/DL (ref 0.6–1.1)
ESTIMATED AVERAGE GLUCOSE: 197.3 MG/DL
GFR SERPL CREATININE-BSD FRML MDRD: >60 ML/MIN/{1.73_M2}
GLUCOSE BLD-MCNC: 217 MG/DL (ref 70–99)
HBA1C MFR BLD: 8.5 %
HDLC SERPL-MCNC: 57 MG/DL (ref 40–60)
LDL CHOLESTEROL CALCULATED: 163 MG/DL
POTASSIUM SERPL-SCNC: 4.3 MMOL/L (ref 3.5–5.1)
SODIUM BLD-SCNC: 132 MMOL/L (ref 136–145)
T3 FREE: 2.7 PG/ML (ref 2.3–4.2)
T4 FREE: 1 NG/DL (ref 0.9–1.8)
TOTAL PROTEIN: 6.9 G/DL (ref 6.4–8.2)
TRIGL SERPL-MCNC: 173 MG/DL (ref 0–150)
TSH SERPL DL<=0.05 MIU/L-ACNC: 3.82 UIU/ML (ref 0.27–4.2)
VITAMIN D 25-HYDROXY: 41.7 NG/ML
VLDLC SERPL CALC-MCNC: 35 MG/DL

## 2022-10-20 PROCEDURE — 3051F HG A1C>EQUAL 7.0%<8.0%: CPT | Performed by: INTERNAL MEDICINE

## 2022-10-20 PROCEDURE — G8484 FLU IMMUNIZE NO ADMIN: HCPCS | Performed by: INTERNAL MEDICINE

## 2022-10-20 PROCEDURE — 2022F DILAT RTA XM EVC RTNOPTHY: CPT | Performed by: INTERNAL MEDICINE

## 2022-10-20 PROCEDURE — 99214 OFFICE O/P EST MOD 30 MIN: CPT | Performed by: INTERNAL MEDICINE

## 2022-10-20 PROCEDURE — 1036F TOBACCO NON-USER: CPT | Performed by: INTERNAL MEDICINE

## 2022-10-20 PROCEDURE — G8417 CALC BMI ABV UP PARAM F/U: HCPCS | Performed by: INTERNAL MEDICINE

## 2022-10-20 PROCEDURE — G8427 DOCREV CUR MEDS BY ELIG CLIN: HCPCS | Performed by: INTERNAL MEDICINE

## 2022-10-20 RX ORDER — LEVOTHYROXINE SODIUM 112 UG/1
112 TABLET ORAL DAILY
Qty: 30 TABLET | Refills: 3 | Status: SHIPPED | OUTPATIENT
Start: 2022-10-20

## 2022-10-20 RX ORDER — ERGOCALCIFEROL 1.25 MG/1
CAPSULE ORAL
Qty: 4 CAPSULE | Refills: 3 | Status: SHIPPED | OUTPATIENT
Start: 2022-10-20

## 2022-10-20 RX ORDER — INSULIN ASPART 100 [IU]/ML
INJECTION, SOLUTION INTRAVENOUS; SUBCUTANEOUS
Qty: 60 ML | Refills: 2 | Status: SHIPPED | OUTPATIENT
Start: 2022-10-20

## 2022-10-20 NOTE — PROGRESS NOTES
Hilda Crandall is a 28 y.o. female who presents for Type 1 diabetes mellitus. Current symptoms/problems include  hyperglycemia  and are worsening. 1.  Poorly controlled type 1 diabetes mellitus with neuropathy ([facility] [address] [E10.40, E10.65]     Diagnosed with Type 1 diabetes mellitus in 2010. Comorbid conditions: Neuropathy     Current diabetic medications include: Novolog  Medtronic Minimed Guardian CGM  Intolerance to diabetes medications: No     Weight trend: stable  Prior visit with dietician: yes  Current diet: on average, 1 meal per day and other snacks  Current exercise: occasional, active     Current monitoring regimen: home blood tests - 5 times daily  Has brought blood glucose log/meter:  Yes  Home blood sugar records: fasting range: 180-250 and postprandial range: 150-250  Any episodes of hypoglycemia? Yes  Hypoglycemia frequency and time(s):  4-5 times a week  Does patient have Glucagon emergency kit? Yes  Does patient have rapid acting carbohydrate? Yes  Does patient wear a medic alert bracelet or necklace? No     2. Hashimoto's thyroiditis  Has fatigue. Dx in 2016.     3. Vitamin D deficiency  Has fatigue. 4. Multinodular Goiter  No difficulty swallowing, hoarseness. .  No family history of thyroid cancer  No radiation to her neck. 5. Hyperlipidemia  No muscle pain     6.  Hypothyroidism  Has fatigue      Past Medical History:   Diagnosis Date    Bipolar 1 disorder ([facility] [address]     Diabetes mellitus ([facility] [address]     Diabetes mellitus type I ([facility] [address]     Diabetic keto-acidosis ([facility] [address] 2014    admitted [facility] [address] [facility]       Patient Active Problem List   Diagnosis    Vitamin D deficiency    Hashimoto's thyroiditis    Bipolar 1 disorder, depressed, moderate (HCC)    Type 1 diabetes, uncontrolled, with neuropathy    Class 1 obesity with body mass index (BMI) of 34.0 to 34.9 in adult    Multinodular goiter    Anxiety    Acquired hypothyroidism    Pregnancy    Other hyperlipidemia Class 2 obesity with body mass index (BMI) of 35.0 to 35.9 in adult    Insulin pump in place    Mild nonproliferative diabetic retinopathy of both eyes without macular edema associated with type 1 diabetes mellitus (HCC)    Proteinuria affecting pregnancy in third trimester    Type 1 diabetes mellitus affecting pregnancy in third trimester, antepartum    Hypercholesteremia    Poorly controlled type 1 diabetes mellitus with neuropathy [facility])     Past Surgical History:   Procedure Laterality Date     SECTION  10/05/2020    LAPAROSCOPY  2016    WISDOM TOOTH EXTRACTION Bilateral 2007     Social History     Socioeconomic History    Marital status: Single     Spouse name: Not on file    Number of children: Not on file    Years of education: Not on file    Highest education level: Not on file   Occupational History    Not on file   Tobacco Use    Smoking status: Former     Packs/day: 0.50     Years: 4.00     Pack years: 2.00     Types: Cigarettes     Quit date: 2019     Years since quittin.9    Smokeless tobacco: Never    Tobacco comments:     social smoker, once a month   Substance and Sexual Activity    Alcohol use: Not Currently     Comment: once a month    Drug use: Not Currently     Types: Marijuana Maye Ege)     Comment: on occ    Sexual activity: Yes   Other Topics Concern    Not on file   Social History Narrative    Not on file     Social Determinants of Health     Financial Resource Strain: Low Risk     Difficulty of Paying Living Expenses: Not hard at all   Food Insecurity: No Food Insecurity    Worried About Running Out of Food in the Last Year: Never true    Ran Out of Food in the Last Year: Never true   Transportation Needs: Not on file   Physical Activity: Not on file   Stress: Not on file   Social Connections: Not on file   Intimate Partner Violence: Not on file   Housing Stability: Not on file     Family History   Problem Relation Age of Onset    Diabetes type 2  Mother     Bipolar Disorder Mother     Hypertension Mother     Alcohol Abuse Father     Depression Father     Asthma Sister     Depression Brother     High Cholesterol Brother      Current Outpatient Medications   Medication Sig Dispense Refill    insulin aspart (NOVOLOG) 100 UNIT/ML injection vial 150 units subq continuously via insulin pump a day 60 mL 2    vitamin D (ERGOCALCIFEROL) 1.25 MG (92851 UT) CAPS capsule TAKE ONE CAPSULE BY MOUTH ONCE WEEKLY 4 capsule 3    levothyroxine (SYNTHROID) 112 MCG tablet Take 1 tablet by mouth Daily TAKE ONE TABLET BY MOUTH DAILY 30 tablet 3    lamoTRIgine (LAMICTAL) 100 MG tablet TAKE TWO TABLETS BY MOUTH DAILY 60 tablet 5    CONTOUR NEXT TEST strip 1 each by In Vitro route 8 times daily As needed. 200 strip 5    FLUoxetine (PROZAC) 20 MG capsule Take 1 capsule by mouth daily 30 capsule 5    glucagon 1 MG injection Inject 1 mg into the muscle See Admin Instructions Follow package directions for low blood sugar. 1 kit 3    acetone, urine, test (KETOSTIX) strip Check urine for ketones if blood glucose is > 250 30 strip 3     No current facility-administered medications for this visit.      No Known Allergies  Family Status   Relation Name Status    Mother  Alive    Father  Alive    Sister  Alive    Brother  Alive       Lab Review:    Lab Results   Component Value Date/Time    WBC 7.3 09/22/2022 02:37 PM    HGB 11.0 09/22/2022 02:37 PM    HCT 34.3 09/22/2022 02:37 PM    MCV 82.5 09/22/2022 02:37 PM     09/22/2022 02:37 PM     Lab Results   Component Value Date/Time     09/22/2022 02:37 PM    K 4.6 09/22/2022 02:37 PM    CL 91 09/22/2022 02:37 PM    CO2 28 09/22/2022 02:37 PM    BUN 14 09/22/2022 02:37 PM    CREATININE 1.2 09/22/2022 02:37 PM    GLUCOSE 263 09/22/2022 02:37 PM    CALCIUM 9.5 09/22/2022 02:37 PM    PROT 7.2 09/22/2022 02:37 PM    LABALBU 4.3 09/22/2022 02:37 PM    BILITOT 0.4 09/22/2022 02:37 PM    ALKPHOS 143 09/22/2022 02:37 PM    AST 12 09/22/2022 02:37 PM    ALT 14 09/22/2022 02:37 PM    LABGLOM 52 09/22/2022 02:37 PM    GFRAA >60 09/22/2022 02:37 PM    GFRAA 177 06/04/2013 04:50 AM    AGRATIO 1.5 09/22/2022 02:37 PM    GLOB 2.3 12/10/2020 09:07 AM     Lab Results   Component Value Date/Time    TSH 0.77 07/19/2022 12:28 PM    FT3 2.6 07/19/2022 12:28 PM     Lab Results   Component Value Date/Time    LABA1C 7.0 07/19/2022 12:28 PM     Lab Results   Component Value Date/Time    .2 07/19/2022 12:28 PM     Lab Results   Component Value Date/Time    CHOL 263 07/19/2022 12:28 PM     Lab Results   Component Value Date/Time    TRIG 409 07/19/2022 12:28 PM     Lab Results   Component Value Date/Time    HDL 52 07/19/2022 12:28 PM     Lab Results   Component Value Date/Time    LDLCALC see below 07/19/2022 12:28 PM     Lab Results   Component Value Date/Time    LABVLDL see below 07/19/2022 12:28 PM     No results found for: [facility]  Lab Results   Component Value Date/Time    LABMICR YES 09/22/2022 02:07 PM     Lab Results   Component Value Date/Time    VITD25 42.3 07/19/2022 12:28 PM        Review of Systems:  Constitutional: has fatigue, no fever, no recent weight gain, no recent weight loss, no changes in appetite  Eyes: no eye pain, no change in vision, no eye redness, no eye irritation, no double vision  Ears, nose, throat: has nasal congestion, no sore throat, no earache, no decrease in hearing, no hoarseness, no dry mouth, has sinus problems, no difficulty swallowing, no neck lumps, no dental problems, no mouth sores, no ringing in ears  Pulmonary: no shortness of breath, no wheezing, no dyspnea on exertion, no cough  Cardiovascular: no chest pain, has lower extremity edema, no orthopnea, no intermittent leg claudication, has palpitations  Gastrointestinal: no abdominal pain, has nausea, no vomiting, has diarrhea, has constipation, no dysphagia, no heartburn, no bloating  Genitourinary: no dysuria, no urinary incontinence, no urinary hesitancy, has urinary frequency, no feelings of urinary urgency, no nocturia  Musculoskeletal: no joint swelling, no joint stiffness, has joint pain, no muscle cramps, no muscle pain, no bone pain  Integument/Breast: has hair loss, no skin rashes, no skin lesions, no itching, has dry skin  Neurological: no numbness, no tingling, no weakness, no confusion, has headaches, no dizziness, no fainting, no tremors, has decrease in memory, no balance problems  Psychiatric: has anxiety, has depression, has insomnia  Hematologic/Lymphatic: no tendency for easy bleeding, no swollen lymph nodes, no tendency for easy bruising  Immunology: has seasonal allergies, no frequent infections, no frequent illnesses  Endocrine: no temperature intolerance    /88   Pulse 78   Temp 98 °F (36.7 °C)   Resp 14   Ht 5' 4\" (1.626 m)   Wt 213 lb (96.6 kg)   SpO2 98%   BMI 36.56 kg/m²    Wt Readings from Last 3 Encounters:   10/20/22 213 lb (96.6 kg)   09/22/22 230 lb (104.3 kg)   07/19/22 230 lb (104.3 kg)     Body mass index is 36.56 kg/m².       OBJECTIVE:  Constitutional: no acute distress, well appearing and well nourished  Psychiatric: oriented to person, place and time, judgement and insight and normal, recent and remote memory and intact and mood and affect are normal  Skin: skin and subcutaneous tissue is normal without mass, normal turgor  Head and Face: examination of head and face revealed no abnormalities  Eyes: no lid or conjunctival swelling, erythema or discharge, pupils are normal, equal, round, reactive to light  Ears/Nose: external inspection of ears and nose revealed no abnormalities, hearing is grossly normal  Oropharynx/Mouth/Face: lips, tongue and gums are normal with no lesions, the voice quality was normal  Neck: neck is supple and symmetric, with midline trachea and no masses, thyroid is enlarged  Lymphatics: normal cervical lymph nodes, normal supraclavicular nodes  Pulmonary: no increased work of breathing or signs of respiratory distress, lungs are clear to auscultation  Cardiovascular: normal heart rate and rhythm, normal S1 and S2, no murmurs and pedal pulses and 2+ bilaterally, No edema  Abdomen: abdomen is soft, non-tender with no masses  Musculoskeletal: normal gait and station and exam of the digits and nails are normal  Neurological: normal coordination and normal general cortical function    Visual inspection:  Deformity/amputation: absent  Skin lesions/pre-ulcerative calluses: present calluses  Edema: right- negative, left- negative     Sensory exam:  Monofilament sensation: normal  (minimum of 5 random plantar locations tested, avoiding callused areas - > 1 area with absence of sensation is + for neuropathy)     Plus at least one of the following:  Pulses: normal  Proprioception: Intact  Vibration (128 Hz): Impaired     ASSESSMENT/PLAN:  1. Type 1 diabetes, uncontrolled, with neuropathy ([facility] [address]  Call for results  Let us know what pump to submit. Has senosr failures  Wants to change to Omnip  HbA1C 10.1-7.0-6.9-6.4-7.3-9.0-7.0  Continue Medtronic pump 670G and Guardian CGM  - Novolog  - Comprehensive Metabolic Panel; Future  GADA positive  C-peptide<0.1     2. Hashimoto's thyroiditis  TSH 2.4-2.1-3.55-1.5-1.85  -Thyroid sonogram  -TPOab, Tg ab.     3. Vitamin D deficiency  25 hydroxy vitamin D 16.3-36.5-67-66.2-42. 3  Viitamin D 50,000 IU every other week  -25OH vitamin D     4. Multinodular Goiter  TSH 1.5-0.68-0.67-1.53  8/22/2019 thyroid sonogram-right 5 mm and left 3 mm colloid nodules/cysts  -Thyroid sono     5. Hyperlipidemia  LDL 15--158  Stopped Rosuvastatin during pregnancy     6.  Hypothyroidism  TSH 2.4-2.1-3.55-1.5-0.67-1.53-0.77  Levothyroxine 0.112 mg qd      Reviewed and/or ordered clinical lab results Yes  Reviewed and/or ordered radiology tests Yes  Reviewed and/or ordered other diagnostic tests No  Discussed test results with performing physician No  Independently reviewed image, tracing, or specimen  Made a decision to obtain old records No  Reviewed and summarized old records Yes  Hemoglobin A1c 10.1  LDL 60  TSH 1.68  Obtained history from other than patient No    Ba Tan was counseled regarding symptoms of diabetes diagnosis, course and complications of disease if inadequately treated, side effects of medications, diagnosis, treatment options, and prognosis, risks, benefits, complications, and alternatives of treatment, labs, imaging and other studies and treatment targets and goals,  targets, goals, importance of diabetes control, thyroid adjustments  She understands instructions and counseling. CGMS Download Review and Recommendations  See scanned report tracing for glucose tracing documentation  This was separate service provided for CGMS interpretation    Average glucose 200± 100 SD  Time in range:  %  Time above 180:  %  Time under 70: %     Basal pattern review: Hyperglycemia, worse in the afternoon   Postprandial pattern review: Hyperglycemia postprandially  Hypoglycemia review: rare, in PM  Activity related review: Not recorded      Based on the data, I recommend:    1. Continue basal rate adjustments    2. Continue sensitivity and insulin to carb ratio adjustments as indicated    3. Hypoglycemia management    4. Continue tight control    Return in about 3 months (around 1/20/2023) for diabetes.

## 2022-10-30 ENCOUNTER — TELEPHONE (OUTPATIENT)
Dept: ENDOCRINOLOGY | Age: 32
End: 2022-10-30

## 2022-10-30 NOTE — TELEPHONE ENCOUNTER
Please inform patient:  Hemoglobin A1c 8.5, uncontrolled diabetes. Did she receive OmniPod? Very high cholesterol. LDL, bad cholesterol, 163. Triglycerides improved from 409 to 173. Thyroid test TSH in range, but different than before. Is she taking levothyroxine and not skipping any? Vitamin D very good.

## 2022-10-31 ENCOUNTER — PATIENT MESSAGE (OUTPATIENT)
Dept: ENDOCRINOLOGY | Age: 32
End: 2022-10-31

## 2022-10-31 NOTE — TELEPHONE ENCOUNTER
I am still taking levothyroxine 112 mcg daily. My a1c will get better once I get back into my routine with everything (which I'm working on).

## 2022-10-31 NOTE — TELEPHONE ENCOUNTER
From: Katie Bullock  Sent: 10/31/2022 1:31 PM EDT  To: [facility]  Subject: Returning Your Call    Hello! Yes-I picked up the [address] [place] yesterday. Is there specific training I will need to complete in order to transition? Also, what do I need to do for Medtr[employer] to end my pump & supplies with them? I am still taking levothyroxine 112 mcg daily. My a1c will get better once I get back into my routine with everything (which I'm working on).

## 2022-11-04 ENCOUNTER — TELEPHONE (OUTPATIENT)
Dept: ENDOCRINOLOGY | Age: 32
End: 2022-11-04

## 2023-01-06 RX ORDER — FLUOXETINE HYDROCHLORIDE 20 MG/1
CAPSULE ORAL
Qty: 30 CAPSULE | Refills: 5 | Status: SHIPPED | OUTPATIENT
Start: 2023-01-06

## 2023-01-06 RX ORDER — ROSUVASTATIN CALCIUM 20 MG/1
TABLET, COATED ORAL
Qty: 30 TABLET | Refills: 5 | Status: SHIPPED | OUTPATIENT
Start: 2023-01-06

## 2023-03-27 DIAGNOSIS — E10.40 POORLY CONTROLLED TYPE 1 DIABETES MELLITUS WITH NEUROPATHY (HCC): ICD-10-CM

## 2023-03-27 DIAGNOSIS — E10.65 POORLY CONTROLLED TYPE 1 DIABETES MELLITUS WITH NEUROPATHY (HCC): ICD-10-CM

## 2023-03-27 RX ORDER — PROCHLORPERAZINE 25 MG/1
SUPPOSITORY RECTAL
Qty: 1 EACH | Refills: 1 | Status: SHIPPED | OUTPATIENT
Start: 2023-03-27

## 2023-05-10 DIAGNOSIS — E10.65 POORLY CONTROLLED TYPE 1 DIABETES MELLITUS WITH NEUROPATHY (HCC): ICD-10-CM

## 2023-05-10 DIAGNOSIS — E04.2 MULTINODULAR GOITER: ICD-10-CM

## 2023-05-10 DIAGNOSIS — E55.9 VITAMIN D DEFICIENCY: ICD-10-CM

## 2023-05-10 DIAGNOSIS — E06.3 HASHIMOTO'S THYROIDITIS: ICD-10-CM

## 2023-05-10 DIAGNOSIS — E03.9 ACQUIRED HYPOTHYROIDISM: ICD-10-CM

## 2023-05-10 DIAGNOSIS — E78.49 OTHER HYPERLIPIDEMIA: ICD-10-CM

## 2023-05-10 DIAGNOSIS — E10.40 POORLY CONTROLLED TYPE 1 DIABETES MELLITUS WITH NEUROPATHY (HCC): ICD-10-CM

## 2023-05-10 RX ORDER — LEVOTHYROXINE SODIUM 112 UG/1
TABLET ORAL
Qty: 90 TABLET | Refills: 0 | Status: SHIPPED | OUTPATIENT
Start: 2023-05-10

## 2023-05-10 RX ORDER — ERGOCALCIFEROL 1.25 MG/1
CAPSULE ORAL
Qty: 12 CAPSULE | Refills: 0 | Status: SHIPPED | OUTPATIENT
Start: 2023-05-10

## 2023-05-10 RX ORDER — LAMOTRIGINE 100 MG/1
TABLET ORAL
Qty: 60 TABLET | Refills: 5 | Status: SHIPPED | OUTPATIENT
Start: 2023-05-10

## 2023-05-10 NOTE — TELEPHONE ENCOUNTER
Medication:   Requested Prescriptions     Pending Prescriptions Disp Refills    lamoTRIgine (LAMICTAL) 100 MG tablet [Pharmacy Med Name: lamoTRIgine 100 MG TABLET] 60 tablet 5     Sig: TAKE TWO TABLETS BY MOUTH DAILY        Last Filled:  10/17/22    Patient Phone Number: [phone] (home) [phone] (work)    Last appt: 9/22/2022   Next appt: Visit date not found    Last OARRS: No flowsheet data found.

## 2023-05-20 DIAGNOSIS — E10.65 POORLY CONTROLLED TYPE 1 DIABETES MELLITUS WITH NEUROPATHY (HCC): ICD-10-CM

## 2023-05-20 DIAGNOSIS — E10.40 POORLY CONTROLLED TYPE 1 DIABETES MELLITUS WITH NEUROPATHY (HCC): ICD-10-CM

## 2023-05-22 RX ORDER — INSULIN ASPART 100 [IU]/ML
INJECTION, SOLUTION INTRAVENOUS; SUBCUTANEOUS
Qty: 40 ML | Refills: 0 | Status: SHIPPED | OUTPATIENT
Start: 2023-05-22

## 2023-06-08 ENCOUNTER — OFFICE VISIT (OUTPATIENT)
Dept: ENDOCRINOLOGY | Age: 33
End: 2023-06-08
Payer: COMMERCIAL

## 2023-06-08 VITALS
TEMPERATURE: 98 F | BODY MASS INDEX: 36.54 KG/M2 | DIASTOLIC BLOOD PRESSURE: 70 MMHG | WEIGHT: 214 LBS | OXYGEN SATURATION: 99 % | SYSTOLIC BLOOD PRESSURE: 103 MMHG | HEIGHT: 64 IN | RESPIRATION RATE: 14 BRPM | HEART RATE: 71 BPM

## 2023-06-08 DIAGNOSIS — E06.3 HASHIMOTO'S THYROIDITIS: ICD-10-CM

## 2023-06-08 DIAGNOSIS — E66.01 CLASS 2 SEVERE OBESITY WITH SERIOUS COMORBIDITY AND BODY MASS INDEX (BMI) OF 35.0 TO 35.9 IN ADULT, UNSPECIFIED OBESITY TYPE (HCC): ICD-10-CM

## 2023-06-08 DIAGNOSIS — E04.2 MULTINODULAR GOITER: ICD-10-CM

## 2023-06-08 DIAGNOSIS — E10.40 POORLY CONTROLLED TYPE 1 DIABETES MELLITUS WITH NEUROPATHY (HCC): Primary | ICD-10-CM

## 2023-06-08 DIAGNOSIS — E78.49 OTHER HYPERLIPIDEMIA: ICD-10-CM

## 2023-06-08 DIAGNOSIS — E03.9 ACQUIRED HYPOTHYROIDISM: ICD-10-CM

## 2023-06-08 DIAGNOSIS — E10.65 POORLY CONTROLLED TYPE 1 DIABETES MELLITUS WITH NEUROPATHY (HCC): Primary | ICD-10-CM

## 2023-06-08 DIAGNOSIS — E55.9 VITAMIN D DEFICIENCY: ICD-10-CM

## 2023-06-08 PROCEDURE — 2022F DILAT RTA XM EVC RTNOPTHY: CPT | Performed by: INTERNAL MEDICINE

## 2023-06-08 PROCEDURE — G8417 CALC BMI ABV UP PARAM F/U: HCPCS | Performed by: INTERNAL MEDICINE

## 2023-06-08 PROCEDURE — 99215 OFFICE O/P EST HI 40 MIN: CPT | Performed by: INTERNAL MEDICINE

## 2023-06-08 PROCEDURE — 3046F HEMOGLOBIN A1C LEVEL >9.0%: CPT | Performed by: INTERNAL MEDICINE

## 2023-06-08 PROCEDURE — 1036F TOBACCO NON-USER: CPT | Performed by: INTERNAL MEDICINE

## 2023-06-08 PROCEDURE — G8427 DOCREV CUR MEDS BY ELIG CLIN: HCPCS | Performed by: INTERNAL MEDICINE

## 2023-06-08 NOTE — PROGRESS NOTES
Nona Bruner is a 35 y.o. female who presents for Type 1 diabetes mellitus. Current symptoms/problems include  hyperglycemia  and are worsening. 1.  Poorly controlled type 1 diabetes mellitus with neuropathy ([facility] [address] [E10.40, E10.65]     Diagnosed with Type 1 diabetes mellitus in 2010. Comorbid conditions: Neuropathy     Current diabetic medications include: Novolog  Medtronic Minimed Guardian CGM in the past  On Omnipod 5  Intolerance to diabetes medications: No     Weight trend: stable  Prior visit with dietician: yes  Current diet: on average, 1 meal per day and other snacks  Current exercise: occasional, active     Current monitoring regimen: home blood tests - 5 times daily  Has brought blood glucose log/meter:  Yes  Home blood sugar records: fasting range: 180-250 and postprandial range: 150-250  Any episodes of hypoglycemia? Yes  Hypoglycemia frequency and time(s):  4-5 times a week  Does patient have Glucagon emergency kit? Yes  Does patient have rapid acting carbohydrate? Yes  Does patient wear a medic alert bracelet or necklace? No     2. Hashimoto's thyroiditis  Has fatigue. Dx in 2016.     3. Vitamin D deficiency  Has fatigue. 4. Multinodular Goiter  No difficulty swallowing, hoarseness. .  No family history of thyroid cancer  No radiation to her neck. 5. Hyperlipidemia  No muscle pain     6. Hypothyroidism  Has fatigue    7.   Obesity  Diet and exercise      Past Medical History:   Diagnosis Date    Bipolar 1 disorder ([facility] [address]     Diabetes mellitus ([facility] [address]     Diabetes mellitus type I ([facility] [address]     Diabetic keto-acidosis ([facility] [address] 2014    admitted at [address] [facility]       Patient Active Problem List   Diagnosis    Vitamin D deficiency    Hashimoto's thyroiditis    Bipolar 1 disorder, depressed, moderate (HCC)    Type 1 diabetes, uncontrolled, with neuropathy    Class 1 obesity with body mass index (BMI) of 34.0 to 34.9 in adult    Multinodular goiter    Anxiety

## 2023-06-26 ENCOUNTER — OFFICE VISIT (OUTPATIENT)
Dept: FAMILY MEDICINE CLINIC | Age: 33
End: 2023-06-26
Payer: COMMERCIAL

## 2023-06-26 VITALS
WEIGHT: 211 LBS | DIASTOLIC BLOOD PRESSURE: 62 MMHG | HEIGHT: 64 IN | BODY MASS INDEX: 36.02 KG/M2 | SYSTOLIC BLOOD PRESSURE: 94 MMHG | HEART RATE: 75 BPM | OXYGEN SATURATION: 98 %

## 2023-06-26 DIAGNOSIS — R11.0 NAUSEA: ICD-10-CM

## 2023-06-26 DIAGNOSIS — R42 DIZZINESS: ICD-10-CM

## 2023-06-26 DIAGNOSIS — R42 DIZZINESS: Primary | ICD-10-CM

## 2023-06-26 DIAGNOSIS — E06.3 HASHIMOTO'S THYROIDITIS: ICD-10-CM

## 2023-06-26 DIAGNOSIS — E03.9 ACQUIRED HYPOTHYROIDISM: ICD-10-CM

## 2023-06-26 DIAGNOSIS — E10.40 POORLY CONTROLLED TYPE 1 DIABETES MELLITUS WITH NEUROPATHY (HCC): ICD-10-CM

## 2023-06-26 DIAGNOSIS — E04.2 MULTINODULAR GOITER: ICD-10-CM

## 2023-06-26 DIAGNOSIS — E55.9 VITAMIN D DEFICIENCY: ICD-10-CM

## 2023-06-26 DIAGNOSIS — E10.65 POORLY CONTROLLED TYPE 1 DIABETES MELLITUS WITH NEUROPATHY (HCC): ICD-10-CM

## 2023-06-26 LAB
ALBUMIN SERPL-MCNC: 4.9 G/DL (ref 3.4–5)
ALBUMIN/GLOB SERPL: 2 {RATIO} (ref 1.1–2.2)
ALP SERPL-CCNC: 113 U/L (ref 40–129)
ALT SERPL-CCNC: 17 U/L (ref 10–40)
ANION GAP SERPL CALCULATED.3IONS-SCNC: 12 MMOL/L (ref 3–16)
AST SERPL-CCNC: 15 U/L (ref 15–37)
BASOPHILS # BLD: 0 K/UL (ref 0–0.2)
BASOPHILS NFR BLD: 0.5 %
BILIRUB SERPL-MCNC: 0.7 MG/DL (ref 0–1)
BUN SERPL-MCNC: 11 MG/DL (ref 7–20)
CALCIUM SERPL-MCNC: 9.7 MG/DL (ref 8.3–10.6)
CHLORIDE SERPL-SCNC: 101 MMOL/L (ref 99–110)
CHOLEST SERPL-MCNC: 113 MG/DL (ref 0–199)
CO2 SERPL-SCNC: 26 MMOL/L (ref 21–32)
CREAT SERPL-MCNC: 0.7 MG/DL (ref 0.6–1.1)
CREAT UR-MCNC: 234.4 MG/DL (ref 28–259)
DEPRECATED RDW RBC AUTO: 12.8 % (ref 12.4–15.4)
EOSINOPHIL # BLD: 0.1 K/UL (ref 0–0.6)
EOSINOPHIL NFR BLD: 1.8 %
GFR SERPLBLD CREATININE-BSD FMLA CKD-EPI: >60 ML/MIN/{1.73_M2}
GLUCOSE SERPL-MCNC: 91 MG/DL (ref 70–99)
HCT VFR BLD AUTO: 40.5 % (ref 36–48)
HDLC SERPL-MCNC: 47 MG/DL (ref 40–60)
HGB BLD-MCNC: 13.5 G/DL (ref 12–16)
LDL CHOLESTEROL CALCULATED: 53 MG/DL
LYMPHOCYTES # BLD: 2.1 K/UL (ref 1–5.1)
LYMPHOCYTES NFR BLD: 35.3 %
MCH RBC QN AUTO: 29.3 PG (ref 26–34)
MCHC RBC AUTO-ENTMCNC: 33.4 G/DL (ref 31–36)
MCV RBC AUTO: 87.6 FL (ref 80–100)
MICROALBUMIN UR DL<=1MG/L-MCNC: 3.5 MG/DL
MICROALBUMIN/CREAT UR: 14.9 MG/G (ref 0–30)
MONOCYTES # BLD: 0.4 K/UL (ref 0–1.3)
MONOCYTES NFR BLD: 7.1 %
NEUTROPHILS # BLD: 3.3 K/UL (ref 1.7–7.7)
NEUTROPHILS NFR BLD: 55.3 %
PLATELET # BLD AUTO: 228 K/UL (ref 135–450)
PMV BLD AUTO: 9.1 FL (ref 5–10.5)
POTASSIUM SERPL-SCNC: 4.3 MMOL/L (ref 3.5–5.1)
PROT SERPL-MCNC: 7.4 G/DL (ref 6.4–8.2)
RBC # BLD AUTO: 4.62 M/UL (ref 4–5.2)
SODIUM SERPL-SCNC: 139 MMOL/L (ref 136–145)
T3FREE SERPL-MCNC: 2.8 PG/ML (ref 2.3–4.2)
T4 FREE SERPL-MCNC: 1.3 NG/DL (ref 0.9–1.8)
TRIGL SERPL-MCNC: 64 MG/DL (ref 0–150)
TSH SERPL DL<=0.005 MIU/L-ACNC: 1.82 UIU/ML (ref 0.27–4.2)
VLDLC SERPL CALC-MCNC: 13 MG/DL
WBC # BLD AUTO: 5.9 K/UL (ref 4–11)

## 2023-06-26 PROCEDURE — 1036F TOBACCO NON-USER: CPT | Performed by: NURSE PRACTITIONER

## 2023-06-26 PROCEDURE — G8427 DOCREV CUR MEDS BY ELIG CLIN: HCPCS | Performed by: NURSE PRACTITIONER

## 2023-06-26 PROCEDURE — 99214 OFFICE O/P EST MOD 30 MIN: CPT | Performed by: NURSE PRACTITIONER

## 2023-06-26 PROCEDURE — G8417 CALC BMI ABV UP PARAM F/U: HCPCS | Performed by: NURSE PRACTITIONER

## 2023-06-26 RX ORDER — MECLIZINE HCL 12.5 MG/1
12.5 TABLET ORAL 3 TIMES DAILY PRN
Qty: 15 TABLET | Refills: 0 | Status: SHIPPED | OUTPATIENT
Start: 2023-06-26 | End: 2023-07-06

## 2023-06-26 ASSESSMENT — ENCOUNTER SYMPTOMS
EYE DISCHARGE: 0
NAUSEA: 0
EYE PAIN: 0
SORE THROAT: 0
SINUS PAIN: 0
CHOKING: 0
VOICE CHANGE: 0
BLOOD IN STOOL: 0
EYE ITCHING: 0
VOMITING: 0
BACK PAIN: 0
SINUS PRESSURE: 0
WHEEZING: 0
TROUBLE SWALLOWING: 0
CONSTIPATION: 0
RHINORRHEA: 0
STRIDOR: 0
COLOR CHANGE: 0
PHOTOPHOBIA: 0
EYE REDNESS: 0
SHORTNESS OF BREATH: 0
DIARRHEA: 0
COUGH: 0
CHEST TIGHTNESS: 0
ABDOMINAL PAIN: 0

## 2023-06-27 LAB
25(OH)D3 SERPL-MCNC: 56.7 NG/ML
EST. AVERAGE GLUCOSE BLD GHB EST-MCNC: 180 MG/DL
HBA1C MFR BLD: 7.9 %

## 2023-06-30 ENCOUNTER — TELEPHONE (OUTPATIENT)
Dept: ENDOCRINOLOGY | Age: 33
End: 2023-06-30

## 2023-08-02 DIAGNOSIS — E10.40 POORLY CONTROLLED TYPE 1 DIABETES MELLITUS WITH NEUROPATHY (HCC): ICD-10-CM

## 2023-08-02 DIAGNOSIS — E78.49 OTHER HYPERLIPIDEMIA: ICD-10-CM

## 2023-08-02 DIAGNOSIS — E10.65 POORLY CONTROLLED TYPE 1 DIABETES MELLITUS WITH NEUROPATHY (HCC): ICD-10-CM

## 2023-08-02 DIAGNOSIS — E06.3 HASHIMOTO'S THYROIDITIS: ICD-10-CM

## 2023-08-02 DIAGNOSIS — E04.2 MULTINODULAR GOITER: ICD-10-CM

## 2023-08-02 DIAGNOSIS — E03.9 ACQUIRED HYPOTHYROIDISM: ICD-10-CM

## 2023-08-02 DIAGNOSIS — E55.9 VITAMIN D DEFICIENCY: ICD-10-CM

## 2023-08-02 RX ORDER — ERGOCALCIFEROL 1.25 MG/1
CAPSULE ORAL
Qty: 12 CAPSULE | Refills: 0 | OUTPATIENT
Start: 2023-08-02

## 2023-08-02 RX ORDER — ROSUVASTATIN CALCIUM 20 MG/1
TABLET, COATED ORAL
Qty: 30 TABLET | Refills: 5 | Status: SHIPPED | OUTPATIENT
Start: 2023-08-02

## 2023-08-02 RX ORDER — ERGOCALCIFEROL 1.25 MG/1
CAPSULE ORAL
Qty: 12 CAPSULE | Refills: 0 | Status: SHIPPED | OUTPATIENT
Start: 2023-08-02

## 2023-08-02 RX ORDER — LEVOTHYROXINE SODIUM 112 UG/1
TABLET ORAL
Qty: 90 TABLET | Refills: 0 | Status: SHIPPED | OUTPATIENT
Start: 2023-08-02

## 2023-08-02 RX ORDER — INSULIN ASPART 100 [IU]/ML
INJECTION, SOLUTION INTRAVENOUS; SUBCUTANEOUS
Qty: 40 ML | Refills: 0 | Status: SHIPPED | OUTPATIENT
Start: 2023-08-02

## 2023-08-02 RX ORDER — FLUOXETINE HYDROCHLORIDE 20 MG/1
CAPSULE ORAL
Qty: 30 CAPSULE | Refills: 5 | Status: SHIPPED | OUTPATIENT
Start: 2023-08-02

## 2023-08-02 NOTE — TELEPHONE ENCOUNTER
Medication:   Requested Prescriptions     Pending Prescriptions Disp Refills    FLUoxetine (PROZAC) 20 MG capsule [Pharmacy Med Name: FLUoxetine HCL 20 MG CAPSULE] 30 capsule 5     Sig: TAKE ONE CAPSULE BY MOUTH DAILY        Last Filled:  1/6/23    Patient Phone Number: [phone] (home) [phone] (work)    Last appt: 6/26/2023 - 1-2 weeks will update me, will consider ENT vs neurology. She will work on eating and drinking more consistently, will monitor BS and BP. Next appt: Visit date not found    Last OARRS: No flowsheet data found.

## 2023-08-08 DIAGNOSIS — E55.9 VITAMIN D DEFICIENCY: ICD-10-CM

## 2023-08-08 DIAGNOSIS — E04.2 MULTINODULAR GOITER: ICD-10-CM

## 2023-08-08 DIAGNOSIS — E03.9 ACQUIRED HYPOTHYROIDISM: ICD-10-CM

## 2023-08-08 DIAGNOSIS — E06.3 HASHIMOTO'S THYROIDITIS: ICD-10-CM

## 2023-08-08 DIAGNOSIS — E78.49 OTHER HYPERLIPIDEMIA: ICD-10-CM

## 2023-08-08 RX ORDER — LEVOTHYROXINE SODIUM 112 UG/1
TABLET ORAL
Qty: 90 TABLET | Refills: 0 | Status: SHIPPED | OUTPATIENT
Start: 2023-08-08

## 2023-09-25 DIAGNOSIS — E10.40 POORLY CONTROLLED TYPE 1 DIABETES MELLITUS WITH NEUROPATHY (HCC): ICD-10-CM

## 2023-09-25 DIAGNOSIS — E10.65 POORLY CONTROLLED TYPE 1 DIABETES MELLITUS WITH NEUROPATHY (HCC): ICD-10-CM

## 2023-09-25 RX ORDER — PROCHLORPERAZINE 25 MG/1
SUPPOSITORY RECTAL
Qty: 9 EACH | Refills: 0 | Status: SHIPPED | OUTPATIENT
Start: 2023-09-25

## 2023-09-25 RX ORDER — INSULIN PMP CART,AUT,G6/7,CNTR
EACH SUBCUTANEOUS
Qty: 45 EACH | Refills: 0 | Status: SHIPPED | OUTPATIENT
Start: 2023-09-25

## 2023-09-25 RX ORDER — INSULIN ASPART 100 [IU]/ML
INJECTION, SOLUTION INTRAVENOUS; SUBCUTANEOUS
Qty: 40 ML | Refills: 0 | Status: SHIPPED | OUTPATIENT
Start: 2023-09-25 | End: 2023-10-23

## 2023-10-23 DIAGNOSIS — E03.9 ACQUIRED HYPOTHYROIDISM: ICD-10-CM

## 2023-10-23 DIAGNOSIS — E78.49 OTHER HYPERLIPIDEMIA: ICD-10-CM

## 2023-10-23 DIAGNOSIS — E55.9 VITAMIN D DEFICIENCY: ICD-10-CM

## 2023-10-23 DIAGNOSIS — E10.65 POORLY CONTROLLED TYPE 1 DIABETES MELLITUS WITH NEUROPATHY (HCC): ICD-10-CM

## 2023-10-23 DIAGNOSIS — E10.40 POORLY CONTROLLED TYPE 1 DIABETES MELLITUS WITH NEUROPATHY (HCC): ICD-10-CM

## 2023-10-23 DIAGNOSIS — E06.3 HASHIMOTO'S THYROIDITIS: ICD-10-CM

## 2023-10-23 DIAGNOSIS — E04.2 MULTINODULAR GOITER: ICD-10-CM

## 2023-10-23 RX ORDER — INSULIN ASPART 100 [IU]/ML
INJECTION, SOLUTION INTRAVENOUS; SUBCUTANEOUS
Qty: 40 ML | Refills: 0 | Status: SHIPPED | OUTPATIENT
Start: 2023-10-23

## 2023-10-23 RX ORDER — ERGOCALCIFEROL 1.25 MG/1
CAPSULE ORAL
Qty: 12 CAPSULE | Refills: 0 | Status: SHIPPED | OUTPATIENT
Start: 2023-10-23

## 2023-11-26 DIAGNOSIS — E10.65 POORLY CONTROLLED TYPE 1 DIABETES MELLITUS WITH NEUROPATHY (HCC): ICD-10-CM

## 2023-11-26 DIAGNOSIS — E10.40 POORLY CONTROLLED TYPE 1 DIABETES MELLITUS WITH NEUROPATHY (HCC): ICD-10-CM

## 2023-11-27 RX ORDER — INSULIN ASPART 100 [IU]/ML
INJECTION, SOLUTION INTRAVENOUS; SUBCUTANEOUS
Qty: 40 ML | Refills: 0 | Status: SHIPPED | OUTPATIENT
Start: 2023-11-27

## 2023-12-06 DIAGNOSIS — E03.9 ACQUIRED HYPOTHYROIDISM: ICD-10-CM

## 2023-12-06 DIAGNOSIS — E78.49 OTHER HYPERLIPIDEMIA: ICD-10-CM

## 2023-12-06 DIAGNOSIS — E55.9 VITAMIN D DEFICIENCY: ICD-10-CM

## 2023-12-06 DIAGNOSIS — E10.40 POORLY CONTROLLED TYPE 1 DIABETES MELLITUS WITH NEUROPATHY (HCC): ICD-10-CM

## 2023-12-06 DIAGNOSIS — E06.3 HASHIMOTO'S THYROIDITIS: ICD-10-CM

## 2023-12-06 DIAGNOSIS — E10.65 POORLY CONTROLLED TYPE 1 DIABETES MELLITUS WITH NEUROPATHY (HCC): ICD-10-CM

## 2023-12-06 LAB
25(OH)D3 SERPL-MCNC: 77 NG/ML
ALBUMIN SERPL-MCNC: 4.4 G/DL (ref 3.4–5)
ALBUMIN/GLOB SERPL: 1.8 {RATIO} (ref 1.1–2.2)
ALP SERPL-CCNC: 109 U/L (ref 40–129)
ALT SERPL-CCNC: 14 U/L (ref 10–40)
ANION GAP SERPL CALCULATED.3IONS-SCNC: 7 MMOL/L (ref 3–16)
AST SERPL-CCNC: 13 U/L (ref 15–37)
BILIRUB SERPL-MCNC: 0.3 MG/DL (ref 0–1)
BUN SERPL-MCNC: 9 MG/DL (ref 7–20)
CALCIUM SERPL-MCNC: 9.1 MG/DL (ref 8.3–10.6)
CHLORIDE SERPL-SCNC: 101 MMOL/L (ref 99–110)
CHOLEST SERPL-MCNC: 124 MG/DL (ref 0–199)
CO2 SERPL-SCNC: 28 MMOL/L (ref 21–32)
CREAT SERPL-MCNC: 0.7 MG/DL (ref 0.6–1.1)
CREAT UR-MCNC: 229 MG/DL (ref 28–259)
GFR SERPLBLD CREATININE-BSD FMLA CKD-EPI: >60 ML/MIN/{1.73_M2}
GLUCOSE SERPL-MCNC: 229 MG/DL (ref 70–99)
HDLC SERPL-MCNC: 49 MG/DL (ref 40–60)
LDL CHOLESTEROL CALCULATED: 60 MG/DL
MICROALBUMIN UR DL<=1MG/L-MCNC: 1.5 MG/DL
MICROALBUMIN/CREAT UR: 6.6 MG/G (ref 0–30)
POTASSIUM SERPL-SCNC: 4.6 MMOL/L (ref 3.5–5.1)
PROT SERPL-MCNC: 6.8 G/DL (ref 6.4–8.2)
SODIUM SERPL-SCNC: 136 MMOL/L (ref 136–145)
T3FREE SERPL-MCNC: 2.5 PG/ML (ref 2.3–4.2)
T4 FREE SERPL-MCNC: 1.1 NG/DL (ref 0.9–1.8)
TRIGL SERPL-MCNC: 76 MG/DL (ref 0–150)
TSH SERPL DL<=0.005 MIU/L-ACNC: 0.36 UIU/ML (ref 0.27–4.2)
VLDLC SERPL CALC-MCNC: 15 MG/DL

## 2023-12-07 LAB
EST. AVERAGE GLUCOSE BLD GHB EST-MCNC: 171.4 MG/DL
HBA1C MFR BLD: 7.6 %

## 2023-12-08 PROBLEM — E66.01 CLASS 2 SEVERE OBESITY WITH SERIOUS COMORBIDITY AND BODY MASS INDEX (BMI) OF 36.0 TO 36.9 IN ADULT (HCC): Status: ACTIVE | Noted: 2021-11-04

## 2023-12-09 NOTE — PROGRESS NOTES
CGM correction feature    7. Change AIT to 3 hours    Return in about 6 months (around 6/11/2024) for diabetes.

## 2023-12-11 ENCOUNTER — OFFICE VISIT (OUTPATIENT)
Dept: ENDOCRINOLOGY | Age: 33
End: 2023-12-11
Payer: COMMERCIAL

## 2023-12-11 VITALS
HEART RATE: 78 BPM | OXYGEN SATURATION: 100 % | TEMPERATURE: 98 F | BODY MASS INDEX: 35.34 KG/M2 | SYSTOLIC BLOOD PRESSURE: 120 MMHG | DIASTOLIC BLOOD PRESSURE: 66 MMHG | WEIGHT: 207 LBS | HEIGHT: 64 IN | RESPIRATION RATE: 14 BRPM

## 2023-12-11 DIAGNOSIS — E06.3 HASHIMOTO'S THYROIDITIS: ICD-10-CM

## 2023-12-11 DIAGNOSIS — E10.65 POORLY CONTROLLED TYPE 1 DIABETES MELLITUS WITH NEUROPATHY (HCC): Primary | ICD-10-CM

## 2023-12-11 DIAGNOSIS — E55.9 VITAMIN D DEFICIENCY: ICD-10-CM

## 2023-12-11 DIAGNOSIS — E03.9 ACQUIRED HYPOTHYROIDISM: ICD-10-CM

## 2023-12-11 DIAGNOSIS — E10.40 POORLY CONTROLLED TYPE 1 DIABETES MELLITUS WITH NEUROPATHY (HCC): Primary | ICD-10-CM

## 2023-12-11 DIAGNOSIS — E04.2 MULTINODULAR GOITER: ICD-10-CM

## 2023-12-11 DIAGNOSIS — E78.49 OTHER HYPERLIPIDEMIA: ICD-10-CM

## 2023-12-11 DIAGNOSIS — E66.01 CLASS 2 SEVERE OBESITY WITH SERIOUS COMORBIDITY AND BODY MASS INDEX (BMI) OF 35.0 TO 35.9 IN ADULT, UNSPECIFIED OBESITY TYPE (HCC): ICD-10-CM

## 2023-12-11 PROBLEM — E66.9 CLASS 2 OBESITY WITH BODY MASS INDEX (BMI) OF 35.0 TO 35.9 IN ADULT: Status: ACTIVE | Noted: 2023-12-11

## 2023-12-11 PROCEDURE — 3051F HG A1C>EQUAL 7.0%<8.0%: CPT | Performed by: INTERNAL MEDICINE

## 2023-12-11 PROCEDURE — 99214 OFFICE O/P EST MOD 30 MIN: CPT | Performed by: INTERNAL MEDICINE

## 2023-12-11 PROCEDURE — 2022F DILAT RTA XM EVC RTNOPTHY: CPT | Performed by: INTERNAL MEDICINE

## 2023-12-11 PROCEDURE — G8484 FLU IMMUNIZE NO ADMIN: HCPCS | Performed by: INTERNAL MEDICINE

## 2023-12-11 PROCEDURE — G8417 CALC BMI ABV UP PARAM F/U: HCPCS | Performed by: INTERNAL MEDICINE

## 2023-12-11 PROCEDURE — 1036F TOBACCO NON-USER: CPT | Performed by: INTERNAL MEDICINE

## 2023-12-11 PROCEDURE — G8427 DOCREV CUR MEDS BY ELIG CLIN: HCPCS | Performed by: INTERNAL MEDICINE

## 2023-12-11 PROCEDURE — 95251 CONT GLUC MNTR ANALYSIS I&R: CPT | Performed by: INTERNAL MEDICINE

## 2023-12-11 RX ORDER — INSULIN PMP CART,AUT,G6/7,CNTR
EACH SUBCUTANEOUS
Qty: 45 EACH | Refills: 2 | Status: SHIPPED | OUTPATIENT
Start: 2023-12-11

## 2023-12-11 RX ORDER — PROCHLORPERAZINE 25 MG/1
SUPPOSITORY RECTAL
Qty: 1 EACH | Refills: 1 | Status: SHIPPED | OUTPATIENT
Start: 2023-12-11

## 2023-12-11 RX ORDER — PROCHLORPERAZINE 25 MG/1
SUPPOSITORY RECTAL
Qty: 9 EACH | Refills: 1 | Status: SHIPPED | OUTPATIENT
Start: 2023-12-11

## 2023-12-11 RX ORDER — ERGOCALCIFEROL 1.25 MG/1
CAPSULE ORAL
Qty: 12 CAPSULE | Refills: 2 | Status: SHIPPED | OUTPATIENT
Start: 2023-12-11

## 2023-12-11 RX ORDER — INSULIN ASPART 100 [IU]/ML
INJECTION, SOLUTION INTRAVENOUS; SUBCUTANEOUS
Qty: 40 ML | Refills: 2 | Status: SHIPPED | OUTPATIENT
Start: 2023-12-11

## 2023-12-11 RX ORDER — LEVOTHYROXINE SODIUM 112 UG/1
112 TABLET ORAL DAILY
Qty: 90 TABLET | Refills: 2 | Status: SHIPPED | OUTPATIENT
Start: 2023-12-11

## 2023-12-31 ENCOUNTER — TELEPHONE (OUTPATIENT)
Dept: ENDOCRINOLOGY | Age: 33
End: 2023-12-31

## 2024-01-22 NOTE — TELEPHONE ENCOUNTER
Medication:   Requested Prescriptions     Pending Prescriptions Disp Refills    lamoTRIgine (LAMICTAL) 100 MG tablet [Pharmacy Med Name: lamoTRIgine 100 MG TABLET] 60 tablet 5     Sig: TAKE TWO TABLETS BY MOUTH DAILY        Last Filled:  5/10/23    Patient Phone Number: [phone] (home) [phone] (work)    Last appt: 6/26/2023   Next appt: Visit date not found    Last OARRS:        No data to display

## 2024-01-23 RX ORDER — LAMOTRIGINE 100 MG/1
TABLET ORAL
Qty: 60 TABLET | Refills: 5 | Status: SHIPPED | OUTPATIENT
Start: 2024-01-23

## 2024-01-30 ENCOUNTER — OFFICE VISIT (OUTPATIENT)
Dept: FAMILY MEDICINE CLINIC | Age: 34
End: 2024-01-30
Payer: COMMERCIAL

## 2024-01-30 VITALS
OXYGEN SATURATION: 97 % | DIASTOLIC BLOOD PRESSURE: 68 MMHG | SYSTOLIC BLOOD PRESSURE: 106 MMHG | HEIGHT: 64 IN | WEIGHT: 212 LBS | HEART RATE: 78 BPM | BODY MASS INDEX: 36.19 KG/M2

## 2024-01-30 DIAGNOSIS — R53.83 FATIGUE, UNSPECIFIED TYPE: ICD-10-CM

## 2024-01-30 DIAGNOSIS — M25.50 ARTHRALGIA, UNSPECIFIED JOINT: Primary | ICD-10-CM

## 2024-01-30 DIAGNOSIS — G43.009 MIGRAINE WITHOUT AURA AND WITHOUT STATUS MIGRAINOSUS, NOT INTRACTABLE: ICD-10-CM

## 2024-01-30 PROCEDURE — 99213 OFFICE O/P EST LOW 20 MIN: CPT | Performed by: FAMILY MEDICINE

## 2024-01-30 PROCEDURE — 1036F TOBACCO NON-USER: CPT | Performed by: FAMILY MEDICINE

## 2024-01-30 PROCEDURE — G8427 DOCREV CUR MEDS BY ELIG CLIN: HCPCS | Performed by: FAMILY MEDICINE

## 2024-01-30 PROCEDURE — G8417 CALC BMI ABV UP PARAM F/U: HCPCS | Performed by: FAMILY MEDICINE

## 2024-01-30 PROCEDURE — G8484 FLU IMMUNIZE NO ADMIN: HCPCS | Performed by: FAMILY MEDICINE

## 2024-01-30 SDOH — ECONOMIC STABILITY: FOOD INSECURITY: WITHIN THE PAST 12 MONTHS, THE FOOD YOU BOUGHT JUST DIDN'T LAST AND YOU DIDN'T HAVE MONEY TO GET MORE.: NEVER TRUE

## 2024-01-30 SDOH — ECONOMIC STABILITY: HOUSING INSECURITY
IN THE LAST 12 MONTHS, WAS THERE A TIME WHEN YOU DID NOT HAVE A STEADY PLACE TO SLEEP OR SLEPT IN A SHELTER (INCLUDING NOW)?: NO

## 2024-01-30 SDOH — ECONOMIC STABILITY: INCOME INSECURITY: HOW HARD IS IT FOR YOU TO PAY FOR THE VERY BASICS LIKE FOOD, HOUSING, MEDICAL CARE, AND HEATING?: NOT HARD AT ALL

## 2024-01-30 SDOH — ECONOMIC STABILITY: FOOD INSECURITY: WITHIN THE PAST 12 MONTHS, YOU WORRIED THAT YOUR FOOD WOULD RUN OUT BEFORE YOU GOT MONEY TO BUY MORE.: NEVER TRUE

## 2024-01-30 ASSESSMENT — PATIENT HEALTH QUESTIONNAIRE - PHQ9
5. POOR APPETITE OR OVEREATING: 2
9. THOUGHTS THAT YOU WOULD BE BETTER OFF DEAD, OR OF HURTING YOURSELF: 0
SUM OF ALL RESPONSES TO PHQ9 QUESTIONS 1 & 2: 2
SUM OF ALL RESPONSES TO PHQ QUESTIONS 1-9: 14
7. TROUBLE CONCENTRATING ON THINGS, SUCH AS READING THE NEWSPAPER OR WATCHING TELEVISION: 1
2. FEELING DOWN, DEPRESSED OR HOPELESS: 1
6. FEELING BAD ABOUT YOURSELF - OR THAT YOU ARE A FAILURE OR HAVE LET YOURSELF OR YOUR FAMILY DOWN: 3
SUM OF ALL RESPONSES TO PHQ QUESTIONS 1-9: 14
8. MOVING OR SPEAKING SO SLOWLY THAT OTHER PEOPLE COULD HAVE NOTICED. OR THE OPPOSITE, BEING SO FIGETY OR RESTLESS THAT YOU HAVE BEEN MOVING AROUND A LOT MORE THAN USUAL: 0
SUM OF ALL RESPONSES TO PHQ QUESTIONS 1-9: 14
4. FEELING TIRED OR HAVING LITTLE ENERGY: 3
10. IF YOU CHECKED OFF ANY PROBLEMS, HOW DIFFICULT HAVE THESE PROBLEMS MADE IT FOR YOU TO DO YOUR WORK, TAKE CARE OF THINGS AT HOME, OR GET ALONG WITH OTHER PEOPLE: 1
1. LITTLE INTEREST OR PLEASURE IN DOING THINGS: 1
SUM OF ALL RESPONSES TO PHQ QUESTIONS 1-9: 14

## 2024-01-30 NOTE — PROGRESS NOTES
Annabel Bellamy (:  1990) is a 33 y.o. female,Established patient, here for evaluation of the following chief complaint(s):  Joint Pain (Extreme fatigue, all joints hurt, headaches  ongoing 6 months)         ASSESSMENT/PLAN:  Annabel was seen today for joint pain.    Diagnoses and all orders for this visit:    Arthralgia, unspecified joint  -     Cyclic Citrul Peptide Antibody, IgG; Future  -     Sedimentation Rate; Future  -     C-Reactive Protein; Future  -     Rheumatoid Factor; Future  -     RANJIT profile; Future  -     Anti-Neutrophilic Cytoplasmic Antibody; Future  blood work to evaluate  If all neg consider fibromyalgia/CFS  Fatigue, unspecified type  -     RANJIT profile; Future  -     Anti-Neutrophilic Cytoplasmic Antibody; Future  -     Che - Bandar Epstein MD, Sleep Medicine, [place]  -     CBC; Future  -     Vitamin B12 & Folate; Future  -     Ferritin; Future  Sleep study to evaluate  Migraine without aura and without status migrainosus, not intractable  Not well controlled but prefers to just continue prn excedrin for now       No follow-ups on file.         Subjective   SUBJECTIVE/OBJECTIVE:  HPI  Pt is a of 33 y.o. female comes in today with   Chief Complaint   Patient presents with    Joint Pain     Extreme fatigue, all joints hurt, headaches  ongoing 6 months     At least 6 months ago noticed fatigue.  Sleep not restorative.  Getting over 8 hours and still tired.  Knees, hips, elbows hurt-more joint then muscle pain.  3-4 headache/week.  Start occipital or frontal  Excedrin helps a little.      Vitals:    24 0921   BP: 106/68   Pulse: 78   SpO2: 97%   Weight: 96.2 kg (212 lb)   Height: 1.626 m (5' 4\")       Past Medical History:Reviewed  Medications:Reviewed.  No Known Allergies   Social hx:Reviewed.  Social History     Tobacco Use   Smoking Status Former    Current packs/day: 0.00    Average packs/day: 0.5 packs/day for 4.0 years (2.0 ttl pk-yrs)    Types: Cigarettes    Start

## 2024-04-03 RX ORDER — ROSUVASTATIN CALCIUM 20 MG/1
20 TABLET, COATED ORAL DAILY
Qty: 30 TABLET | Refills: 5 | Status: SHIPPED | OUTPATIENT
Start: 2024-04-03

## 2024-04-03 NOTE — TELEPHONE ENCOUNTER
Medication:   Requested Prescriptions     Pending Prescriptions Disp Refills    rosuvastatin (CRESTOR) 20 MG tablet [Pharmacy Med Name: ROSUVASTATIN CALCIUM 20 MG TAB] 30 tablet 5     Sig: TAKE 1 TABLET BY MOUTH DAILY       Last Filled:  8/2/23    Patient Phone Number: [phone] (home) [phone] (work)    Last appt: 1/30/2024   Next appt: Visit date not found    Last Labs DM:   Lab Results   Component Value Date/Time    LABA1C 7.6 12/06/2023 08:17 AM     Last Lipid:   Lab Results   Component Value Date/Time    CHOL 255 10/20/2022 08:32 AM    TRIG 173 10/20/2022 08:32 AM    HDL 49 12/06/2023 08:17 AM    LDLCALC 60 12/06/2023 08:17 AM     Last PSA: No results found for: \"PSA\"  Last Thyroid:   Lab Results   Component Value Date/Time    TSH 0.36 12/06/2023 08:17 AM    FT3 2.5 12/06/2023 08:17 AM    T4FREE 1.1 12/06/2023 08:17 AM

## 2024-04-16 DIAGNOSIS — E10.40 POORLY CONTROLLED TYPE 1 DIABETES MELLITUS WITH NEUROPATHY (HCC): ICD-10-CM

## 2024-04-16 DIAGNOSIS — E10.65 POORLY CONTROLLED TYPE 1 DIABETES MELLITUS WITH NEUROPATHY (HCC): ICD-10-CM

## 2024-04-17 RX ORDER — INSULIN PMP CART,AUT,G6/7,CNTR
EACH SUBCUTANEOUS
Qty: 45 EACH | Refills: 2 | Status: SHIPPED | OUTPATIENT
Start: 2024-04-17

## 2024-04-17 RX ORDER — PROCHLORPERAZINE 25 MG/1
SUPPOSITORY RECTAL
Qty: 9 EACH | Refills: 1 | Status: SHIPPED | OUTPATIENT
Start: 2024-04-17

## 2024-04-17 NOTE — TELEPHONE ENCOUNTER
Updated omnipod 5 pods to change q 1.5 days instead of 2 days.     Have 1 g6 sensor sample at DF set aside for pt.     LVM to pt w/ instructions    Next step would be to call her insurance and ask for fax # so we can send a letter. No formal way to do P.A., pharmacy would have to override.

## 2024-05-03 RX ORDER — FLUOXETINE HYDROCHLORIDE 20 MG/1
20 CAPSULE ORAL DAILY
Qty: 30 CAPSULE | Refills: 5 | Status: SHIPPED | OUTPATIENT
Start: 2024-05-03

## 2024-05-03 NOTE — TELEPHONE ENCOUNTER
Medication:   Requested Prescriptions     Pending Prescriptions Disp Refills    FLUoxetine (PROZAC) 20 MG capsule [Pharmacy Med Name: FLUoxetine HCL 20 MG CAPSULE] 30 capsule 5     Sig: TAKE 1 CAPSULE BY MOUTH DAILY        Last Filled:  8/02/23    Patient Phone Number: [phone] (home) [phone] (work)    Last appt: 1/30/2024   Next appt: Visit date not found    Last OARRS:        No data to display

## 2024-05-15 DIAGNOSIS — E10.40 POORLY CONTROLLED TYPE 1 DIABETES MELLITUS WITH NEUROPATHY (HCC): ICD-10-CM

## 2024-05-15 DIAGNOSIS — E10.65 POORLY CONTROLLED TYPE 1 DIABETES MELLITUS WITH NEUROPATHY (HCC): ICD-10-CM

## 2024-05-15 RX ORDER — INSULIN ASPART 100 [IU]/ML
INJECTION, SOLUTION INTRAVENOUS; SUBCUTANEOUS
Qty: 40 ML | Refills: 1 | Status: SHIPPED | OUTPATIENT
Start: 2024-05-15

## 2024-05-27 NOTE — TELEPHONE ENCOUNTER
Called and informed pt, pt expressed understanding.    
Please inform patient that thyroid ultrasound showed heterogeneous thyroid consistent with Hashimoto's thyroiditis, but no dominant nodules were described.  
2

## 2024-06-26 DIAGNOSIS — E06.3 HASHIMOTO'S THYROIDITIS: ICD-10-CM

## 2024-06-26 DIAGNOSIS — E03.9 ACQUIRED HYPOTHYROIDISM: ICD-10-CM

## 2024-06-26 DIAGNOSIS — E55.9 VITAMIN D DEFICIENCY: ICD-10-CM

## 2024-06-26 DIAGNOSIS — R53.83 FATIGUE, UNSPECIFIED TYPE: ICD-10-CM

## 2024-06-26 DIAGNOSIS — E10.40 POORLY CONTROLLED TYPE 1 DIABETES MELLITUS WITH NEUROPATHY (HCC): ICD-10-CM

## 2024-06-26 DIAGNOSIS — E10.65 POORLY CONTROLLED TYPE 1 DIABETES MELLITUS WITH NEUROPATHY (HCC): ICD-10-CM

## 2024-06-26 DIAGNOSIS — E78.49 OTHER HYPERLIPIDEMIA: ICD-10-CM

## 2024-06-26 DIAGNOSIS — E04.2 MULTINODULAR GOITER: ICD-10-CM

## 2024-06-26 DIAGNOSIS — M25.50 ARTHRALGIA, UNSPECIFIED JOINT: ICD-10-CM

## 2024-06-26 LAB
25(OH)D3 SERPL-MCNC: 95.2 NG/ML
ALBUMIN SERPL-MCNC: 4.5 G/DL (ref 3.4–5)
ALBUMIN/GLOB SERPL: 1.7 {RATIO} (ref 1.1–2.2)
ALP SERPL-CCNC: 87 U/L (ref 40–129)
ALT SERPL-CCNC: 16 U/L (ref 10–40)
ANION GAP SERPL CALCULATED.3IONS-SCNC: 12 MMOL/L (ref 3–16)
AST SERPL-CCNC: 14 U/L (ref 15–37)
BILIRUB SERPL-MCNC: 0.7 MG/DL (ref 0–1)
BUN SERPL-MCNC: 8 MG/DL (ref 7–20)
CALCIUM SERPL-MCNC: 9.4 MG/DL (ref 8.3–10.6)
CHLORIDE SERPL-SCNC: 106 MMOL/L (ref 99–110)
CHOLEST SERPL-MCNC: 114 MG/DL (ref 0–199)
CO2 SERPL-SCNC: 25 MMOL/L (ref 21–32)
CREAT SERPL-MCNC: 0.8 MG/DL (ref 0.6–1.1)
CREAT UR-MCNC: 243.9 MG/DL (ref 28–259)
CRP SERPL-MCNC: <3 MG/L (ref 0–5.1)
DEPRECATED RDW RBC AUTO: 12.3 % (ref 12.4–15.4)
ERYTHROCYTE [SEDIMENTATION RATE] IN BLOOD BY WESTERGREN METHOD: 7 MM/HR (ref 0–20)
FERRITIN SERPL IA-MCNC: 51 NG/ML (ref 15–150)
FERRITIN SERPL IA-MCNC: 51.2 NG/ML (ref 15–150)
FOLATE SERPL-MCNC: 18.49 NG/ML (ref 4.78–24.2)
GFR SERPLBLD CREATININE-BSD FMLA CKD-EPI: >90 ML/MIN/{1.73_M2}
GLUCOSE SERPL-MCNC: 214 MG/DL (ref 70–99)
HCT VFR BLD AUTO: 40.4 % (ref 36–48)
HDLC SERPL-MCNC: 50 MG/DL (ref 40–60)
HGB BLD-MCNC: 13.7 G/DL (ref 12–16)
LDL CHOLESTEROL: 45 MG/DL
MCH RBC QN AUTO: 29.5 PG (ref 26–34)
MCHC RBC AUTO-ENTMCNC: 33.9 G/DL (ref 31–36)
MCV RBC AUTO: 87 FL (ref 80–100)
MICROALBUMIN UR DL<=1MG/L-MCNC: 2.1 MG/DL
MICROALBUMIN/CREAT UR: 8.6 MG/G (ref 0–30)
PLATELET # BLD AUTO: 227 K/UL (ref 135–450)
PMV BLD AUTO: 9.6 FL (ref 5–10.5)
POTASSIUM SERPL-SCNC: 4.3 MMOL/L (ref 3.5–5.1)
PROT SERPL-MCNC: 7.2 G/DL (ref 6.4–8.2)
RBC # BLD AUTO: 4.65 M/UL (ref 4–5.2)
RHEUMATOID FACT SER IA-ACNC: <10 IU/ML
SODIUM SERPL-SCNC: 143 MMOL/L (ref 136–145)
T3FREE SERPL-MCNC: 3.2 PG/ML (ref 2.3–4.2)
T4 FREE SERPL-MCNC: 1.4 NG/DL (ref 0.9–1.8)
TRIGL SERPL-MCNC: 93 MG/DL (ref 0–150)
TSH SERPL DL<=0.005 MIU/L-ACNC: 0.2 UIU/ML (ref 0.27–4.2)
VIT B12 SERPL-MCNC: 673 PG/ML (ref 211–911)
VLDLC SERPL CALC-MCNC: 19 MG/DL
WBC # BLD AUTO: 5.3 K/UL (ref 4–11)

## 2024-06-27 LAB
ANA SER QL IA: NEGATIVE
CCP IGG SERPL-ACNC: <0.5 U/ML (ref 0–2.9)
EST. AVERAGE GLUCOSE BLD GHB EST-MCNC: 157.1 MG/DL
HBA1C MFR BLD: 7.1 %

## 2024-07-01 ENCOUNTER — OFFICE VISIT (OUTPATIENT)
Dept: ENDOCRINOLOGY | Age: 34
End: 2024-07-01
Payer: COMMERCIAL

## 2024-07-01 VITALS
OXYGEN SATURATION: 99 % | TEMPERATURE: 98 F | DIASTOLIC BLOOD PRESSURE: 67 MMHG | HEART RATE: 60 BPM | RESPIRATION RATE: 14 BRPM | SYSTOLIC BLOOD PRESSURE: 115 MMHG | WEIGHT: 213 LBS | BODY MASS INDEX: 36.37 KG/M2 | HEIGHT: 64 IN

## 2024-07-01 DIAGNOSIS — E03.9 ACQUIRED HYPOTHYROIDISM: ICD-10-CM

## 2024-07-01 DIAGNOSIS — E78.49 OTHER HYPERLIPIDEMIA: ICD-10-CM

## 2024-07-01 DIAGNOSIS — E10.40 POORLY CONTROLLED TYPE 1 DIABETES MELLITUS WITH NEUROPATHY (HCC): Primary | ICD-10-CM

## 2024-07-01 DIAGNOSIS — E06.3 HASHIMOTO'S THYROIDITIS: ICD-10-CM

## 2024-07-01 DIAGNOSIS — E55.9 VITAMIN D DEFICIENCY: ICD-10-CM

## 2024-07-01 DIAGNOSIS — E10.65 POORLY CONTROLLED TYPE 1 DIABETES MELLITUS WITH NEUROPATHY (HCC): Primary | ICD-10-CM

## 2024-07-01 DIAGNOSIS — E66.01 CLASS 2 SEVERE OBESITY WITH SERIOUS COMORBIDITY AND BODY MASS INDEX (BMI) OF 36.0 TO 36.9 IN ADULT, UNSPECIFIED OBESITY TYPE (HCC): ICD-10-CM

## 2024-07-01 DIAGNOSIS — E04.2 MULTINODULAR GOITER: ICD-10-CM

## 2024-07-01 PROCEDURE — 3051F HG A1C>EQUAL 7.0%<8.0%: CPT | Performed by: INTERNAL MEDICINE

## 2024-07-01 PROCEDURE — 1036F TOBACCO NON-USER: CPT | Performed by: INTERNAL MEDICINE

## 2024-07-01 PROCEDURE — 99214 OFFICE O/P EST MOD 30 MIN: CPT | Performed by: INTERNAL MEDICINE

## 2024-07-01 PROCEDURE — 95251 CONT GLUC MNTR ANALYSIS I&R: CPT | Performed by: INTERNAL MEDICINE

## 2024-07-01 PROCEDURE — G8417 CALC BMI ABV UP PARAM F/U: HCPCS | Performed by: INTERNAL MEDICINE

## 2024-07-01 PROCEDURE — G8427 DOCREV CUR MEDS BY ELIG CLIN: HCPCS | Performed by: INTERNAL MEDICINE

## 2024-07-01 PROCEDURE — 2022F DILAT RTA XM EVC RTNOPTHY: CPT | Performed by: INTERNAL MEDICINE

## 2024-07-01 RX ORDER — ERGOCALCIFEROL 1.25 MG/1
50000 CAPSULE ORAL
Qty: 12 CAPSULE | Refills: 1
Start: 2024-07-01

## 2024-07-01 RX ORDER — LEVOTHYROXINE SODIUM 112 UG/1
TABLET ORAL
Qty: 90 TABLET | Refills: 1
Start: 2024-07-01

## 2024-07-05 DIAGNOSIS — E03.9 ACQUIRED HYPOTHYROIDISM: ICD-10-CM

## 2024-07-05 DIAGNOSIS — E78.49 OTHER HYPERLIPIDEMIA: ICD-10-CM

## 2024-07-05 DIAGNOSIS — E10.65 POORLY CONTROLLED TYPE 1 DIABETES MELLITUS WITH NEUROPATHY (HCC): ICD-10-CM

## 2024-07-05 DIAGNOSIS — E04.2 MULTINODULAR GOITER: ICD-10-CM

## 2024-07-05 DIAGNOSIS — E10.40 POORLY CONTROLLED TYPE 1 DIABETES MELLITUS WITH NEUROPATHY (HCC): ICD-10-CM

## 2024-07-05 DIAGNOSIS — E55.9 VITAMIN D DEFICIENCY: ICD-10-CM

## 2024-07-05 DIAGNOSIS — E06.3 HASHIMOTO'S THYROIDITIS: ICD-10-CM

## 2024-07-08 RX ORDER — ERGOCALCIFEROL 1.25 MG/1
50000 CAPSULE ORAL WEEKLY
Qty: 12 CAPSULE | Refills: 1 | Status: SHIPPED | OUTPATIENT
Start: 2024-07-08

## 2024-07-08 RX ORDER — INSULIN ASPART 100 [IU]/ML
INJECTION, SOLUTION INTRAVENOUS; SUBCUTANEOUS
Qty: 50 ML | Refills: 1 | Status: SHIPPED | OUTPATIENT
Start: 2024-07-08

## 2024-07-24 NOTE — TELEPHONE ENCOUNTER
Medication:   Requested Prescriptions     Pending Prescriptions Disp Refills    lamoTRIgine (LAMICTAL) 100 MG tablet [Pharmacy Med Name: lamoTRIgine 100 MG TABLET] 60 tablet 5     Sig: TAKE 2 TABLETS BY MOUTH DAILY        Last Filled:  1/23/24    Patient Phone Number: [phone] (home) [phone] (work)    Last appt: 1/30/2024   Next appt: Visit date not found    Last OARRS:        No data to display                /

## 2024-07-30 RX ORDER — LAMOTRIGINE 100 MG/1
200 TABLET ORAL DAILY
Qty: 60 TABLET | Refills: 5 | Status: SHIPPED | OUTPATIENT
Start: 2024-07-30

## 2024-08-16 ENCOUNTER — OFFICE VISIT (OUTPATIENT)
Dept: FAMILY MEDICINE CLINIC | Age: 34
End: 2024-08-16
Payer: COMMERCIAL

## 2024-08-16 VITALS
TEMPERATURE: 98.3 F | HEART RATE: 82 BPM | HEIGHT: 63 IN | DIASTOLIC BLOOD PRESSURE: 82 MMHG | OXYGEN SATURATION: 98 % | SYSTOLIC BLOOD PRESSURE: 120 MMHG | WEIGHT: 216 LBS | BODY MASS INDEX: 38.27 KG/M2

## 2024-08-16 DIAGNOSIS — E66.01 CLASS 2 SEVERE OBESITY WITH SERIOUS COMORBIDITY AND BODY MASS INDEX (BMI) OF 36.0 TO 36.9 IN ADULT, UNSPECIFIED OBESITY TYPE (HCC): Primary | ICD-10-CM

## 2024-08-16 PROCEDURE — 1036F TOBACCO NON-USER: CPT | Performed by: FAMILY MEDICINE

## 2024-08-16 PROCEDURE — G8417 CALC BMI ABV UP PARAM F/U: HCPCS | Performed by: FAMILY MEDICINE

## 2024-08-16 PROCEDURE — G8427 DOCREV CUR MEDS BY ELIG CLIN: HCPCS | Performed by: FAMILY MEDICINE

## 2024-08-16 PROCEDURE — 99213 OFFICE O/P EST LOW 20 MIN: CPT | Performed by: FAMILY MEDICINE

## 2024-08-16 RX ORDER — PHENTERMINE HYDROCHLORIDE 37.5 MG/1
37.5 TABLET ORAL
Qty: 30 TABLET | Refills: 0 | Status: SHIPPED | OUTPATIENT
Start: 2024-08-16 | End: 2024-09-15

## 2024-08-16 RX ORDER — LEVONORGESTREL 52 MG/1
1 INTRAUTERINE DEVICE INTRAUTERINE ONCE
COMMUNITY

## 2024-08-16 ASSESSMENT — PATIENT HEALTH QUESTIONNAIRE - PHQ9
SUM OF ALL RESPONSES TO PHQ QUESTIONS 1-9: 0
1. LITTLE INTEREST OR PLEASURE IN DOING THINGS: NOT AT ALL
8. MOVING OR SPEAKING SO SLOWLY THAT OTHER PEOPLE COULD HAVE NOTICED. OR THE OPPOSITE, BEING SO FIGETY OR RESTLESS THAT YOU HAVE BEEN MOVING AROUND A LOT MORE THAN USUAL: NOT AT ALL
SUM OF ALL RESPONSES TO PHQ QUESTIONS 1-9: 0
SUM OF ALL RESPONSES TO PHQ QUESTIONS 1-9: 0
SUM OF ALL RESPONSES TO PHQ9 QUESTIONS 1 & 2: 0
7. TROUBLE CONCENTRATING ON THINGS, SUCH AS READING THE NEWSPAPER OR WATCHING TELEVISION: NOT AT ALL
3. TROUBLE FALLING OR STAYING ASLEEP: NOT AT ALL
SUM OF ALL RESPONSES TO PHQ QUESTIONS 1-9: 0
9. THOUGHTS THAT YOU WOULD BE BETTER OFF DEAD, OR OF HURTING YOURSELF: NOT AT ALL
6. FEELING BAD ABOUT YOURSELF - OR THAT YOU ARE A FAILURE OR HAVE LET YOURSELF OR YOUR FAMILY DOWN: NOT AT ALL
5. POOR APPETITE OR OVEREATING: NOT AT ALL
10. IF YOU CHECKED OFF ANY PROBLEMS, HOW DIFFICULT HAVE THESE PROBLEMS MADE IT FOR YOU TO DO YOUR WORK, TAKE CARE OF THINGS AT HOME, OR GET ALONG WITH OTHER PEOPLE: NOT DIFFICULT AT ALL
4. FEELING TIRED OR HAVING LITTLE ENERGY: NOT AT ALL
2. FEELING DOWN, DEPRESSED OR HOPELESS: NOT AT ALL

## 2024-08-16 ASSESSMENT — ANXIETY QUESTIONNAIRES
4. TROUBLE RELAXING: NOT AT ALL
7. FEELING AFRAID AS IF SOMETHING AWFUL MIGHT HAPPEN: NOT AT ALL
IF YOU CHECKED OFF ANY PROBLEMS ON THIS QUESTIONNAIRE, HOW DIFFICULT HAVE THESE PROBLEMS MADE IT FOR YOU TO DO YOUR WORK, TAKE CARE OF THINGS AT HOME, OR GET ALONG WITH OTHER PEOPLE: NOT DIFFICULT AT ALL
6. BECOMING EASILY ANNOYED OR IRRITABLE: NOT AT ALL
3. WORRYING TOO MUCH ABOUT DIFFERENT THINGS: NOT AT ALL
1. FEELING NERVOUS, ANXIOUS, OR ON EDGE: NOT AT ALL
2. NOT BEING ABLE TO STOP OR CONTROL WORRYING: NOT AT ALL
5. BEING SO RESTLESS THAT IT IS HARD TO SIT STILL: NOT AT ALL
GAD7 TOTAL SCORE: 0

## 2024-08-16 NOTE — PROGRESS NOTES
Annabel Bellamy (:  1990) is a 34 y.o. female,Established patient, here for evaluation of the following chief complaint(s):  Weight Management (Discuss  weight  loss)      Assessment & Plan   ASSESSMENT/PLAN:  Annabel Santiago\" was seen today for weight management.    Diagnoses and all orders for this visit:    Class 2 severe obesity with serious comorbidity and body mass index (BMI) of 36.0 to 36.9 in adult, unspecified obesity type (HCC)  -     phentermine (ADIPEX-P) 37.5 MG tablet; Take 1 tablet by mouth every morning (before breakfast) for 30 days. Max Daily Amount: 37.5 mg    Not well controlled  Continue diet and exercise  Trial of adipex  Medication side affects and adverse reactions reviewed.  Ok for 3 month check     No follow-ups on file.         Subjective   SUBJECTIVE/OBJECTIVE:  HPI  Pt is a of 34 y.o. female comes in today with   Chief Complaint   Patient presents with    Weight Management     Discuss  weight  loss     Working on diet and exercise.  Active but no dedicated exercise.    Vitals:    24 0842   BP: 120/82   Site: Right Upper Arm   Position: Sitting   Cuff Size: Large Adult   Pulse: 82   Temp: 98.3 °F (36.8 °C)   TempSrc: Oral   SpO2: 98%   Weight: 98 kg (216 lb)   Height: 1.6 m (5' 3\")       Past Medical History:Reviewed  Medications:Reviewed.  No Known Allergies   Social hx:Reviewed.  Social History     Tobacco Use   Smoking Status Former    Current packs/day: 0.00    Average packs/day: 0.5 packs/day for 4.0 years (2.0 ttl pk-yrs)    Types: Cigarettes    Start date: 2015    Quit date: 2019    Years since quittin.7   Smokeless Tobacco Never   Tobacco Comments    social smoker, once a month     Review of Systems       Objective   Physical Exam         An electronic signature was used to authenticate this note.    --Tarik Lozoya MD

## 2024-08-29 ENCOUNTER — HOSPITAL ENCOUNTER (OUTPATIENT)
Dept: GENERAL RADIOLOGY | Age: 34
Discharge: HOME | End: 2024-08-29
Payer: COMMERCIAL

## 2024-08-29 ENCOUNTER — OFFICE VISIT (OUTPATIENT)
Dept: FAMILY MEDICINE CLINIC | Age: 34
End: 2024-08-29
Payer: COMMERCIAL

## 2024-08-29 VITALS
SYSTOLIC BLOOD PRESSURE: 117 MMHG | WEIGHT: 217 LBS | HEIGHT: 64 IN | BODY MASS INDEX: 37.05 KG/M2 | HEART RATE: 90 BPM | OXYGEN SATURATION: 97 % | DIASTOLIC BLOOD PRESSURE: 80 MMHG

## 2024-08-29 DIAGNOSIS — M54.2 NECK PAIN: Primary | ICD-10-CM

## 2024-08-29 DIAGNOSIS — M54.2 NECK PAIN: ICD-10-CM

## 2024-08-29 PROCEDURE — 99213 OFFICE O/P EST LOW 20 MIN: CPT | Performed by: NURSE PRACTITIONER

## 2024-08-29 PROCEDURE — G8427 DOCREV CUR MEDS BY ELIG CLIN: HCPCS | Performed by: NURSE PRACTITIONER

## 2024-08-29 PROCEDURE — 72040 X-RAY EXAM NECK SPINE 2-3 VW: CPT

## 2024-08-29 PROCEDURE — G8417 CALC BMI ABV UP PARAM F/U: HCPCS | Performed by: NURSE PRACTITIONER

## 2024-08-29 PROCEDURE — 1036F TOBACCO NON-USER: CPT | Performed by: NURSE PRACTITIONER

## 2024-08-29 RX ORDER — NAPROXEN 250 MG/1
250 TABLET ORAL 2 TIMES DAILY WITH MEALS
Qty: 14 TABLET | Refills: 0 | Status: SHIPPED | OUTPATIENT
Start: 2024-08-29 | End: 2024-09-05

## 2024-08-29 ASSESSMENT — ENCOUNTER SYMPTOMS
EYE REDNESS: 0
PHOTOPHOBIA: 0
SORE THROAT: 0
SHORTNESS OF BREATH: 0
VOICE CHANGE: 0
RHINORRHEA: 0
BACK PAIN: 0
SINUS PRESSURE: 0
DIARRHEA: 0
EYE ITCHING: 0
EYE PAIN: 0
COLOR CHANGE: 0
CHEST TIGHTNESS: 0
WHEEZING: 0
SINUS PAIN: 0
TROUBLE SWALLOWING: 0
COUGH: 0
VOMITING: 0
STRIDOR: 0
EYE DISCHARGE: 0
CONSTIPATION: 0
BLOOD IN STOOL: 0
ABDOMINAL PAIN: 0
NAUSEA: 0
CHOKING: 0

## 2024-08-29 NOTE — PROGRESS NOTES
Chief Complaint   Patient presents with    Back Pain    Neck Pain       /80   Pulse 90   Ht 1.626 m (5' 4\")   Wt 98.4 kg (217 lb)   LMP 2024 Comment: has iud  SpO2 97%   BMI 37.25 kg/m²     HPI:  Annabel Bellamy is a 34 y.o. (: 1990) here today   for   HPI    Patient's medications, allergies, past medical, surgical, social and family histories were reviewed and updated as appropriate.    Back pain/neck pain: woke up yesterday in pain, has seen chiropractor since 15 yo, yesterday a 10/10 pain, left side of neck if puts head down. No trauma. Pain putting chin to chest, left neck to left hip. Melany numbness and tingling. Last chiropractor appt last Friday. No weakness. Can replicate. Can lay down, states pretty comfortable as long as not looking down.     If worse will go to ER.     ROS:  Review of Systems   Constitutional:  Positive for activity change. Negative for appetite change, chills, diaphoresis, fatigue, fever and unexpected weight change.   HENT:  Negative for congestion, ear discharge, ear pain, hearing loss, nosebleeds, postnasal drip, rhinorrhea, sinus pressure, sinus pain, sneezing, sore throat, tinnitus, trouble swallowing and voice change.    Eyes:  Negative for photophobia, pain, discharge, redness and itching.   Respiratory:  Negative for cough, choking, chest tightness, shortness of breath, wheezing and stridor.    Cardiovascular:  Negative for chest pain, palpitations and leg swelling.   Gastrointestinal:  Negative for abdominal pain, blood in stool, constipation, diarrhea, nausea and vomiting.   Endocrine: Negative for cold intolerance, heat intolerance, polydipsia and polyuria.   Genitourinary:  Negative for difficulty urinating, dysuria, enuresis, flank pain, frequency, hematuria and urgency.   Musculoskeletal:  Positive for neck pain. Negative for back pain, gait problem, joint swelling and neck stiffness.        Neck pain   Skin:  Negative for color change, pallor, rash  present.      Comments: Grasp, push pull 5 out of 5 strength, does not cause pain   Lymphadenopathy:      Cervical: No cervical adenopathy.   Skin:     General: Skin is warm and dry.      Capillary Refill: Capillary refill takes less than 2 seconds.      Findings: No rash.   Neurological:      Mental Status: She is alert and oriented to person, place, and time.      Sensory: Sensation is intact.      Motor: Motor function is intact.      Coordination: Coordination normal.   Psychiatric:         Attention and Perception: Attention and perception normal.         Mood and Affect: Mood normal.         Speech: Speech normal.         Behavior: Behavior normal. Behavior is cooperative.         Thought Content: Thought content normal.         Cognition and Memory: Cognition normal.         Judgment: Judgment normal.       ASSESSMENT/PLAN:    1. Neck pain  -     Ange Camara PA-C, Orthopedic Surgery (Spine), [place]  -     naproxen (NAPROSYN) 250 MG tablet; Take 1 tablet by mouth 2 times daily (with meals) for 7 days, Disp-14 tablet, R-0Normal  -     XR CERVICAL SPINE FLEXION AND EXTENSION; Future     Apply a compressive ACE bandage. Rest and elevate the affected painful area.  Apply cold compresses intermittently as needed.  As pain recedes, begin normal activities slowly as tolerated.  Call if symptoms persist.

## 2024-09-11 ENCOUNTER — OFFICE VISIT (OUTPATIENT)
Dept: ORTHOPEDIC SURGERY | Age: 34
End: 2024-09-11
Payer: COMMERCIAL

## 2024-09-11 VITALS — WEIGHT: 217 LBS | BODY MASS INDEX: 37.05 KG/M2 | HEIGHT: 64 IN

## 2024-09-11 DIAGNOSIS — G89.29 CHRONIC BILATERAL LOW BACK PAIN WITHOUT SCIATICA: Primary | ICD-10-CM

## 2024-09-11 DIAGNOSIS — M54.50 CHRONIC BILATERAL LOW BACK PAIN WITHOUT SCIATICA: Primary | ICD-10-CM

## 2024-09-11 PROCEDURE — G8417 CALC BMI ABV UP PARAM F/U: HCPCS | Performed by: PHYSICIAN ASSISTANT

## 2024-09-11 PROCEDURE — 99204 OFFICE O/P NEW MOD 45 MIN: CPT | Performed by: PHYSICIAN ASSISTANT

## 2024-09-11 PROCEDURE — G8427 DOCREV CUR MEDS BY ELIG CLIN: HCPCS | Performed by: PHYSICIAN ASSISTANT

## 2024-09-11 PROCEDURE — 1036F TOBACCO NON-USER: CPT | Performed by: PHYSICIAN ASSISTANT

## 2024-09-11 RX ORDER — MELOXICAM 15 MG/1
15 TABLET ORAL DAILY PRN
Qty: 30 TABLET | Refills: 0 | Status: SHIPPED | OUTPATIENT
Start: 2024-09-11

## 2024-09-12 ENCOUNTER — TELEPHONE (OUTPATIENT)
Dept: ORTHOPEDIC SURGERY | Age: 34
End: 2024-09-12

## 2024-09-13 DIAGNOSIS — E10.40 POORLY CONTROLLED TYPE 1 DIABETES MELLITUS WITH NEUROPATHY (HCC): ICD-10-CM

## 2024-09-13 DIAGNOSIS — E10.65 POORLY CONTROLLED TYPE 1 DIABETES MELLITUS WITH NEUROPATHY (HCC): ICD-10-CM

## 2024-09-13 DIAGNOSIS — E66.01 CLASS 2 SEVERE OBESITY WITH SERIOUS COMORBIDITY AND BODY MASS INDEX (BMI) OF 36.0 TO 36.9 IN ADULT, UNSPECIFIED OBESITY TYPE (HCC): ICD-10-CM

## 2024-09-13 RX ORDER — PROCHLORPERAZINE 25 MG/1
SUPPOSITORY RECTAL
Qty: 9 EACH | Refills: 0 | Status: SHIPPED | OUTPATIENT
Start: 2024-09-13

## 2024-09-13 RX ORDER — PROCHLORPERAZINE 25 MG/1
SUPPOSITORY RECTAL
Qty: 1 EACH | Refills: 0 | Status: SHIPPED | OUTPATIENT
Start: 2024-09-13

## 2024-09-13 RX ORDER — ROSUVASTATIN CALCIUM 20 MG/1
20 TABLET, COATED ORAL DAILY
Qty: 30 TABLET | Refills: 5 | Status: SHIPPED | OUTPATIENT
Start: 2024-09-13

## 2024-09-13 RX ORDER — LAMOTRIGINE 100 MG/1
200 TABLET ORAL DAILY
Qty: 60 TABLET | Refills: 5 | Status: SHIPPED | OUTPATIENT
Start: 2024-09-13

## 2024-09-13 RX ORDER — PHENTERMINE HYDROCHLORIDE 37.5 MG/1
37.5 TABLET ORAL
Qty: 30 TABLET | Refills: 0 | Status: SHIPPED | OUTPATIENT
Start: 2024-09-13 | End: 2024-10-13

## 2024-09-25 ENCOUNTER — HOSPITAL ENCOUNTER (OUTPATIENT)
Dept: PHYSICAL THERAPY | Age: 34
Setting detail: THERAPIES SERIES
Discharge: HOME | End: 2024-09-25
Payer: COMMERCIAL

## 2024-09-25 DIAGNOSIS — M54.50 CHRONIC BILATERAL LOW BACK PAIN WITHOUT SCIATICA: Primary | ICD-10-CM

## 2024-09-25 DIAGNOSIS — G89.29 CHRONIC BILATERAL LOW BACK PAIN WITHOUT SCIATICA: Primary | ICD-10-CM

## 2024-09-25 PROCEDURE — 97161 PT EVAL LOW COMPLEX 20 MIN: CPT

## 2024-09-25 PROCEDURE — 97110 THERAPEUTIC EXERCISES: CPT

## 2024-09-27 ENCOUNTER — OFFICE VISIT (OUTPATIENT)
Dept: ENDOCRINOLOGY | Age: 34
End: 2024-09-27
Payer: COMMERCIAL

## 2024-09-27 VITALS
BODY MASS INDEX: 35.17 KG/M2 | HEART RATE: 92 BPM | OXYGEN SATURATION: 99 % | SYSTOLIC BLOOD PRESSURE: 106 MMHG | HEIGHT: 64 IN | RESPIRATION RATE: 14 BRPM | TEMPERATURE: 98 F | WEIGHT: 206 LBS | DIASTOLIC BLOOD PRESSURE: 75 MMHG

## 2024-09-27 DIAGNOSIS — E66.01 CLASS 2 SEVERE OBESITY WITH SERIOUS COMORBIDITY AND BODY MASS INDEX (BMI) OF 35.0 TO 35.9 IN ADULT, UNSPECIFIED OBESITY TYPE: ICD-10-CM

## 2024-09-27 DIAGNOSIS — E03.9 ACQUIRED HYPOTHYROIDISM: ICD-10-CM

## 2024-09-27 DIAGNOSIS — E10.40 POORLY CONTROLLED TYPE 1 DIABETES MELLITUS WITH NEUROPATHY (HCC): Primary | ICD-10-CM

## 2024-09-27 DIAGNOSIS — E78.49 OTHER HYPERLIPIDEMIA: ICD-10-CM

## 2024-09-27 DIAGNOSIS — E06.3 HASHIMOTO'S THYROIDITIS: ICD-10-CM

## 2024-09-27 DIAGNOSIS — R93.89 THYROID WITH HETEROGENEOUS ECHOTEXTURE DETERMINED BY ULTRASOUND: ICD-10-CM

## 2024-09-27 DIAGNOSIS — E55.9 VITAMIN D DEFICIENCY: ICD-10-CM

## 2024-09-27 DIAGNOSIS — E10.65 POORLY CONTROLLED TYPE 1 DIABETES MELLITUS WITH NEUROPATHY (HCC): Primary | ICD-10-CM

## 2024-09-27 PROCEDURE — 2022F DILAT RTA XM EVC RTNOPTHY: CPT | Performed by: INTERNAL MEDICINE

## 2024-09-27 PROCEDURE — 99214 OFFICE O/P EST MOD 30 MIN: CPT | Performed by: INTERNAL MEDICINE

## 2024-09-27 PROCEDURE — G8417 CALC BMI ABV UP PARAM F/U: HCPCS | Performed by: INTERNAL MEDICINE

## 2024-09-27 PROCEDURE — 3051F HG A1C>EQUAL 7.0%<8.0%: CPT | Performed by: INTERNAL MEDICINE

## 2024-09-27 PROCEDURE — 95251 CONT GLUC MNTR ANALYSIS I&R: CPT | Performed by: INTERNAL MEDICINE

## 2024-09-27 PROCEDURE — 1036F TOBACCO NON-USER: CPT | Performed by: INTERNAL MEDICINE

## 2024-09-27 PROCEDURE — G8427 DOCREV CUR MEDS BY ELIG CLIN: HCPCS | Performed by: INTERNAL MEDICINE

## 2024-09-27 RX ORDER — INSULIN ASPART 100 [IU]/ML
INJECTION, SOLUTION INTRAVENOUS; SUBCUTANEOUS
Qty: 50 ML | Refills: 1 | Status: SHIPPED | OUTPATIENT
Start: 2024-09-27